# Patient Record
Sex: FEMALE | Race: ASIAN | NOT HISPANIC OR LATINO | Employment: UNEMPLOYED | URBAN - METROPOLITAN AREA
[De-identification: names, ages, dates, MRNs, and addresses within clinical notes are randomized per-mention and may not be internally consistent; named-entity substitution may affect disease eponyms.]

---

## 2017-01-06 ENCOUNTER — TRANSFERRED RECORDS (OUTPATIENT)
Dept: HEALTH INFORMATION MANAGEMENT | Facility: CLINIC | Age: 42
End: 2017-01-06

## 2017-03-06 ENCOUNTER — OFFICE VISIT (OUTPATIENT)
Dept: ENDOCRINOLOGY | Facility: CLINIC | Age: 42
End: 2017-03-06

## 2017-03-06 VITALS
HEIGHT: 64 IN | WEIGHT: 244.4 LBS | DIASTOLIC BLOOD PRESSURE: 80 MMHG | BODY MASS INDEX: 41.73 KG/M2 | HEART RATE: 90 BPM | SYSTOLIC BLOOD PRESSURE: 126 MMHG

## 2017-03-06 DIAGNOSIS — C73 THYROID CANCER (H): ICD-10-CM

## 2017-03-06 DIAGNOSIS — E89.0 POSTSURGICAL HYPOTHYROIDISM: ICD-10-CM

## 2017-03-06 DIAGNOSIS — E89.0 POSTSURGICAL HYPOTHYROIDISM: Primary | ICD-10-CM

## 2017-03-06 DIAGNOSIS — R49.0 HOARSENESS: ICD-10-CM

## 2017-03-06 LAB
T4 FREE SERPL-MCNC: 1.33 NG/DL (ref 0.76–1.46)
TSH SERPL DL<=0.05 MIU/L-ACNC: 0.76 MU/L (ref 0.4–4)

## 2017-03-06 RX ORDER — DIPHENOXYLATE HYDROCHLORIDE AND ATROPINE SULFATE 2.5; .025 MG/1; MG/1
1 TABLET ORAL
COMMUNITY

## 2017-03-06 ASSESSMENT — ENCOUNTER SYMPTOMS
STIFFNESS: 1
MYALGIAS: 1
POLYPHAGIA: 0
SINUS CONGESTION: 1
TROUBLE SWALLOWING: 1
RECTAL BLEEDING: 1
DECREASED APPETITE: 0
WEIGHT LOSS: 0
SPUTUM PRODUCTION: 1
RESPIRATORY PAIN: 0
BACK PAIN: 1
COUGH DISTURBING SLEEP: 0
CHILLS: 0
HALLUCINATIONS: 0
HYPOTENSION: 0
JAUNDICE: 0
CONSTIPATION: 1
SYNCOPE: 0
ALTERED TEMPERATURE REGULATION: 1
LEG PAIN: 1
FATIGUE: 1
FEVER: 0
FLANK PAIN: 0
DECREASED LIBIDO: 0
NECK MASS: 0
NECK PAIN: 1
DYSPNEA ON EXERTION: 0
NERVOUS/ANXIOUS: 1
DIARRHEA: 1
LEG SWELLING: 1
COUGH: 1
MUSCLE CRAMPS: 1
NIGHT SWEATS: 1
DEPRESSION: 1
ABDOMINAL PAIN: 1
BLOOD IN STOOL: 0
WEIGHT GAIN: 1
TASTE DISTURBANCE: 1
SORE THROAT: 1
WHEEZING: 1
SHORTNESS OF BREATH: 1
TACHYCARDIA: 1
HEARTBURN: 1
BOWEL INCONTINENCE: 0
SMELL DISTURBANCE: 0
HEMOPTYSIS: 0
CLAUDICATION: 1
MUSCLE WEAKNESS: 1
DYSURIA: 0
INSOMNIA: 1
JOINT SWELLING: 1
DIFFICULTY URINATING: 0
HEMATURIA: 0
EXERCISE INTOLERANCE: 1
HOT FLASHES: 1
SNORES LOUDLY: 1
HOARSE VOICE: 1
DECREASED CONCENTRATION: 1
SINUS PAIN: 1
RECTAL PAIN: 0
ARTHRALGIAS: 1
VOMITING: 1
HYPERTENSION: 1
SLEEP DISTURBANCES DUE TO BREATHING: 0
PALPITATIONS: 1
INCREASED ENERGY: 1
ORTHOPNEA: 1
PANIC: 1
LIGHT-HEADEDNESS: 1
POLYDIPSIA: 0
NAUSEA: 1
BLOATING: 1
POSTURAL DYSPNEA: 1

## 2017-03-06 NOTE — LETTER
3/6/2017       RE: Sinai Shelley  [address]  [facility] [address]     Dear Colleague,    Thank you for referring your patient, Sinai Shelley, to the [facility] ENDOCRINOLOGY at [facility]. Please see a copy of my visit note below.    Endocrinology Consult Note    Attending ASSESSMENT/PLAN:     1.  Papillary thyroid carcinoma, 1.8 cm, + ETE, LN negative, pT3, pN0, pMx; She has been treated with total Tx. TNM stage I or II; MACIS < 6 assuming no distant mets  Labs today;   Discussed radioiodine issue again.  Suggested 123I TBS as next step.  I have counseled her on protocol, gave her example schedule with assignment to let me know when she could do this.    COURTNEY [place] images to follow up on the 10/16 history she described.      2.  Post surgical hypothyroidism. Treat to target TSH < 0.4.Unstable due to recent LT4 dose reduction.  Labs today.    3.  Lung nodules thought to be granulomatous disease by CXR and CT.     4.  Hoarseness and throat symptoms - Recommend OTC GERD treatment for 2 weeks.  IF not resolved she will need ENT evaluation.      .   Karime Horn MD      Cc/HISTORY OF PRESENT ILLNESS 41 year old woman returns for follow up of papillary thyroid cancer and post surgical hypothyoridism. She was last seen by me 8/16 .  At that time she was on  175 x 6.5 /week, divided. Today she reports the lT4 dose is 150 mcg/day.  She has been on this dose since 9/14/16.      On 5/5/15 she underwent total Tx removing  papillary thyroid carcinoma 1.8 cm with + ETE; LN negative 0/2  pT3pN0,pMx    MACIS  Total 4.64 assuming complete and no distant    We have the following tumor marker data:  6/16/14: Tg < 0.1, JOE < 0.4, TSH 0.03  8/18/15: Tg < 0.1, JOE < 0.4, TSH 0.47  8/16/16: Tg < 0.1, JOE < 0.4, TSH 0.01    images on PACS  9/9/13 chest CT: thyroid asymmetrical, right>  L; dense calcs isthmus region  8/18/16 neck US (reviewed by me again today: no  abnormal lymph nodes .     No history of childhood radiation exposure    10/16 she went to ED at [facility].  She had CT scan of the chest.  It showed an abnormality in the neck. She thinks she had an US after that.  She never heard about the results.     REVIEW OF SYSTEMS   weight gain  Energy level is poor  Really tired  Sleep is not good - wakes up a lot.  Voice is a lot more strained - feel like heightened gag reflex as well.    Flu about one month ago  Cardiac: a little tachycardiac felt form time to time -- she last had it last week  Respiratory: RIOS with exertion - ? Vs MS knee problem - see below   GI:  A lot of irritable bowel syndrome - more loose stools  Back pain - low back and into hips  A lot of hot flashes in the night  No longer lactating  Menses sporadic - has Mirena IUD;   A lot of problems with knees and hips - almost feels like weakness-- harder to go down stairs than up; feels in hips and groin      Past Medical History  Past Medical History   Diagnosis Date     Contact dermatitis and other eczema, due to unspecified cause      Ganglion, unspecified      Migraine, unspecified, without mention of intractable migraine without mention of status migrainosus      Nausea alone      Other specified disorders of biliary tract      Pain in joint, site unspecified      PONV (postoperative nausea and vomiting)      Streptococcal sore throat      Unspecified gastritis and gastroduodenitis without mention of hemorrhage      Unspecified vitamin D deficiency      Past Surgical History   Procedure Laterality Date     Appendectomy       Orthopedic surgery       knee arthroscopy, arthrocentesis aspiration of ganglion cyst of left wrist     Laparoscopic cholecystectomy  9/6/2013     Procedure: LAPAROSCOPIC CHOLECYSTECTOMY;  LAPAROSCOPIC CHOLECYSTECTOMY;  Surgeon: Chaparro Benton MD;  [facility]: [facility]     Thyroidectomy N/A 5/5/2015     Procedure: THYROIDECTOMY;  Surgeon: Rosenda Coughlin MD;   "Location: [facility] OR       Medications    Current Outpatient Prescriptions   Medication Sig Dispense Refill     levothyroxine (SYNTHROID, LEVOTHROID) 150 MCG tablet Take 1 tablet (150 mcg) by mouth daily 90 tablet 3     cholecalciferol (VITAMIN D3 MAXIMUM STRENGTH) 5000 UNITS CAPS        Multiple Vitamin (MULTI-VITAMINS) TABS Take 1 tablet by mouth         Allergies  No Known Allergies    Family History  family history is negative for Unknown/Adopted.    Social History  Social History   Substance Use Topics     Smoking status: Never Smoker     Smokeless tobacco: Never Used     Alcohol use No     ; 4 kids (age 2, 5, 11 and 15)      Physical Exam  /80 (BP Location: Right arm, Patient Position: Chair, Cuff Size: Adult Regular)  Pulse 90  Ht 1.626 m (5' 4\")  Wt 110.9 kg (244 lb 6.4 oz)  BMI 41.95 kg/m2  Body mass index is 41.95 kg/(m^2).  GENERAL : pleasant young woman  In no apparent distress.   She sounds hoarse  SKIN: Normal color,temperature, texture.  No hirsutism, alopecia  .  No purple straie  EYES: PER,  No scleral icterus,  No proptosis, conjunctival redness, stare, retraction  NECK: No visible masses. No palpable adenopathy, or masses.   RESP: Lungs clear to auscultation bilaterally  CARDIAC: Regular rate and rhythm, normal S1 S2, without murmurs, rubs or gallops     NEURO: awake, alert, responds appropriately to questions.Moves all extremities; Gait normal.  No  tremor of the outstretched hand.  EXTREMITIES: No clubbing, cyanosis or edema.    DATA REVIEW    ENDO THYROID LABS-[facility] Latest Ref Rng & Units 8/16/2016 11/6/2015   TSH 0.40 - 4.00 mU/L 0.01 (L) 0.07 (L)   T4 FREE 0.76 - 1.46 ng/dL 1.43 1.30       Again, thank you for allowing me to participate in the care of your patient.      Sincerely,    Karime Horn MD      "

## 2017-03-06 NOTE — PROGRESS NOTES
Endocrinology Consult Note    Attending ASSESSMENT/PLAN:     1.  Papillary thyroid carcinoma, 1.8 cm, + ETE, LN negative, pT3, pN0, pMx; She has been treated with total Tx. TNM stage I or II; MACIS < 6 assuming no distant mets  Labs today;   Discussed radioiodine issue again.  Suggested 123I TBS as next step.  I have counseled her on protocol, gave her example schedule with assignment to let me know when she could do this.    COURTNEY [place] images to follow up on the 10/16 history she described.      Addendum : outside images I requested are not yet on PACS as of 3/16/17.     2.  Post surgical hypothyroidism. Treat to target TSH < 0.4.Unstable due to recent LT4 dose reduction.  Labs today.    3.  Lung nodules thought to be granulomatous disease by CXR and CT.     4.  Hoarseness and throat symptoms - Recommend OTC GERD treatment for 2 weeks.  IF not resolved she will need ENT evaluation.      .   Karime oHrn MD      Cc/HISTORY OF PRESENT ILLNESS 41 year old woman returns for follow up of papillary thyroid cancer and post surgical hypothyoridism. She was last seen by me 8/16 .  At that time she was on  175 x 6.5 /week, divided. Today she reports the lT4 dose is 150 mcg/day, a dose reduction made by me.  She has been on this dose since 9/14/16.      On 5/5/15 she underwent total Tx removing  papillary thyroid carcinoma 1.8 cm with + ETE; LN negative 0/2  pT3pN0,pMx    MACIS  Total 4.64 assuming complete and no distant    We have the following tumor marker data:  6/16/14: Tg < 0.1, JOE < 0.4, TSH 0.03  8/18/15: Tg < 0.1, JOE < 0.4, TSH 0.47  8/16/16: Tg < 0.1, JOE < 0.4, TSH 0.01  3/6/17: Tg 0.14, JOE < 0.4, TSH 0.76, free T4 1.33 on LT4 150 mcg/day - this followed the appt - was not discussed at the appt.     images on PACS  9/9/13 chest CT: thyroid asymmetrical, right>  L; dense calcs isthmus region  8/18/16 neck US (reviewed by me again today: no abnormal lymph nodes .     No history of childhood radiation  exposure    10/16 she went to ED at [facility].  She had CT scan of the chest.  It showed an abnormality in the neck. She thinks she had an US after that.  She never heard about the results.     REVIEW OF SYSTEMS   weight gain  Energy level is poor  Really tired  Sleep is not good - wakes up a lot.  Voice is a lot more strained - feel like heightened gag reflex as well.    Flu about one month ago  Cardiac: a little tachycardiac felt form time to time -- she last had it last week  Respiratory: RIOS with exertion - ? Vs MS knee problem - see below   GI:  A lot of irritable bowel syndrome - more loose stools  Back pain - low back and into hips  A lot of hot flashes in the night  No longer lactating  Menses sporadic - has Mirena IUD;   A lot of problems with knees and hips - almost feels like weakness-- harder to go down stairs than up; feels in hips and groin    Past Medical History  Past Medical History   Diagnosis Date     Contact dermatitis and other eczema, due to unspecified cause      Ganglion, unspecified      Migraine, unspecified, without mention of intractable migraine without mention of status migrainosus      Nausea alone      Other specified disorders of biliary tract      Pain in joint, site unspecified      PONV (postoperative nausea and vomiting)      Streptococcal sore throat      Unspecified gastritis and gastroduodenitis without mention of hemorrhage      Unspecified vitamin D deficiency      Past Surgical History   Procedure Laterality Date     Appendectomy       Orthopedic surgery       knee arthroscopy, arthrocentesis aspiration of ganglion cyst of left wrist     Laparoscopic cholecystectomy  9/6/2013     Procedure: LAPAROSCOPIC CHOLECYSTECTOMY;  LAPAROSCOPIC CHOLECYSTECTOMY;  Surgeon: Chaparro Benton MD;  Location: [facility]     Thyroidectomy N/A 5/5/2015     Procedure: THYROIDECTOMY;  Surgeon: Rosenda Coughlin MD;  Location: [facility] OR       Medications    Current Outpatient  "Prescriptions   Medication Sig Dispense Refill     levothyroxine (SYNTHROID, LEVOTHROID) 150 MCG tablet Take 1 tablet (150 mcg) by mouth daily 90 tablet 3     cholecalciferol (VITAMIN D3 MAXIMUM STRENGTH) 5000 UNITS CAPS        Multiple Vitamin (MULTI-VITAMINS) TABS Take 1 tablet by mouth         Allergies  No Known Allergies    Family History  family history is negative for Unknown/Adopted.    Social History  Social History   Substance Use Topics     Smoking status: Never Smoker     Smokeless tobacco: Never Used     Alcohol use No     ; 4 kids (age 2, 5, 11 and 15)      Physical Exam  /80 (BP Location: Right arm, Patient Position: Chair, Cuff Size: Adult Regular)  Pulse 90  Ht 1.626 m (5' 4\")  Wt 110.9 kg (244 lb 6.4 oz)  BMI 41.95 kg/m2  Body mass index is 41.95 kg/(m^2).  GENERAL : pleasant young woman  In no apparent distress.   She sounds hoarse  SKIN: Normal color,temperature, texture.  No hirsutism, alopecia  .  No purple straie  EYES: PER,  No scleral icterus,  No proptosis, conjunctival redness, stare, retraction  NECK: No visible masses. No palpable adenopathy, or masses.   RESP: Lungs clear to auscultation bilaterally  CARDIAC: Regular rate and rhythm, normal S1 S2, without murmurs, rubs or gallops     NEURO: awake, alert, responds appropriately to questions.Moves all extremities; Gait normal.  No  tremor of the outstretched hand.  EXTREMITIES: No clubbing, cyanosis or edema.    DATA REVIEW    Results for BLAISE BELTRAN (MRN [id number]) as of 3/16/2017 20:28   Ref. Range 3/6/2017 14:01   T4 Free Latest Ref Range: 0.76 - 1.46 ng/dL 1.33   TSH Latest Ref Range: 0.40 - 4.00 mU/L 0.76   Lab Scanned Result Unknown THYROG & ANTIBODI...     ENDO THYROID LABS-[facility] Latest Ref Rng & Units 8/16/2016 11/6/2015   TSH 0.40 - 4.00 mU/L 0.01 (L) 0.07 (L)   T4 FREE 0.76 - 1.46 ng/dL 1.43 1.30     "

## 2017-03-06 NOTE — NURSING NOTE
"Chief Complaint   Patient presents with     RECHECK     F/U FOR POSTSURGICAL HYPOTHYROIDISM AND PAPILLARY THYROID CARCINOMA        Initial /80 (BP Location: Right arm, Patient Position: Chair, Cuff Size: Adult Regular)  Pulse 90  Ht 1.626 m (5' 4\")  Wt 110.9 kg (244 lb 6.4 oz)  BMI 41.95 kg/m2 Estimated body mass index is 41.95 kg/(m^2) as calculated from the following:    Height as of this encounter: 1.626 m (5' 4\").    Weight as of this encounter: 110.9 kg (244 lb 6.4 oz).  Medication Reconciliation: complete       Analia Ferrell CMA     "

## 2017-03-06 NOTE — MR AVS SNAPSHOT
After Visit Summary   3/6/2017    Sinai Shelley    MRN: [id number]           Patient Information     Date Of Birth          1975        Visit Information        Provider Department      3/6/2017 1:00 PM Karime Horn MD [facility] Endocrinology        Today's Diagnoses     Postsurgical hypothyroidism    -  1    Papillary thyroid carcinoma          Care Instructions    Try taking an anti acid reflux treatment (such as nexium or prilosec) as directed on the over the counter directions.  Take it faithfully for 2 weeks and see if this clears up the voice.    If it does not you should see ENT    EXAMPLE PROTOCOL FOR THE DIAGNOSTIC WHOLE BODY RADIOACTIVE IODINE SCAN;   Wednesday 2 weeks before the test - Start low iodine diet < 50 mcg/day.   On the week of the test  Monday Thyrogen - this is one very expensive shot.  Given in the clinic [facility]  Tuesday Thyrogen  This is one very expensive shot.  Given in the clinic [facility]  Wednesday Report to Nuclear Medicine ([facility] 2nd fl)  for Radioiodine dose  This is a diagnostic dose of 123I.  This does not require radiation isolation afterwards.   Thursday Report to Nuclear medicine ([facility] 2nd floor)  for total body scan   Friday Lab ([facility] lst floor)  Work can be any time.  Stop low iodine diet.         Follow-ups after your visit        Follow-up notes from your care team     Return in about 6 months (around 9/6/2017).      Your next 10 appointments already scheduled     Mar 06, 2017  2:15 PM CST   LAB with  LAB    Health Lab ([facility])    [address] [place] [address]  1st Floor  [facility] [address] [phone]           Patient must bring picture ID.  Patient should be prepared to give a urine specimen  Please do not eat 10-12 hours before your appointment if you are coming in fasting for labs on lipids, cholesterol, or glucose (sugar).  Pregnant women should follow their Care Team instructions. Water with medications  is okay. Do not drink coffee or other fluids.   If you have concerns about taking  your medications, please ask at office or if scheduling via [employer], send a message by clicking on Secure Messaging, Message Your Care Team.            Sep 12, 2017  1:30 PM CDT   (Arrive by 1:15 PM)   RETURN ENDOCRINE with Karime Horn MD   [facility] Endocrinology ([facility])    [address]  [address] [facility] [address] [phone]              Future tests that were ordered for you today     Open Future Orders        Priority Expected Expires Ordered    TSH Routine  3/6/2018 3/6/2017    T4 free Routine  3/6/2018 3/6/2017    Thyroglobulin (Total, antibody & recovery %) Routine  3/6/2018 3/6/2017            Who to contact     Please call your clinic at [phone] to:    Ask questions about your health    Make or cancel appointments    Discuss your medicines    Learn about your test results    Speak to your doctor   If you have compliments or concerns about an experience at your clinic, or if you wish to file a complaint, please contact [facility] Patient Relations at [phone] or email us at [email or web address]         Additional Information About Your Visit        [employer] Information     [employer] gives you secure access to your electronic health record. If you see a primary care provider, you can also send messages to your care team and make appointments. If you have questions, please call your primary care clinic.  If you do not have a primary care provider, please call [phone] and they will assist you.      [employer] is an electronic gateway that provides easy, online access to your medical records. With [employer], you can request a clinic appointment, read your test results, renew a prescription or communicate with your care team.     To access your existing account, please contact your [facility] or call  "[phone] for assistance.        Care EveryWhere ID     This is your Care EveryWhere ID. This could be used by other organizations to access your [facility] medical records  [id number]        Your Vitals Were     Pulse Height BMI (Body Mass Index)             90 1.626 m (5' 4\") 41.95 kg/m2          Blood Pressure from Last 3 Encounters:   03/06/17 126/80   08/16/16 139/78   11/06/15 147/78    Weight from Last 3 Encounters:   03/06/17 110.9 kg (244 lb 6.4 oz)   08/16/16 107.7 kg (237 lb 6.4 oz)   11/06/15 104.8 kg (231 lb)               Primary Care Provider Office Phone # Fax #    Jim Benoit [phone]       [facility] [id number] [facility] [address]        Thank you!     Thank you for choosing [facility] ENDOCRINOLOGY  for your care. Our goal is always to provide you with excellent care. Hearing back from our patients is one way we can continue to improve our services. Please take a few minutes to complete the written survey that you may receive in the mail after your visit with us. Thank you!             Your Updated Medication List - Protect others around you: Learn how to safely use, store and throw away your medicines at www.disposemymeds.org.          This list is accurate as of: 3/6/17  1:47 PM.  Always use your most recent med list.                   Brand Name Dispense Instructions for use    levothyroxine 150 MCG tablet    SYNTHROID/LEVOTHROID    90 tablet    Take 1 tablet (150 mcg) by mouth daily       MULTI-VITAMINS Tabs      Take 1 tablet by mouth       VITAMIN D3 MAXIMUM STRENGTH 5000 UNITS Caps   Generic drug:  cholecalciferol            "

## 2017-03-06 NOTE — PATIENT INSTRUCTIONS
Try taking an anti acid reflux treatment (such as nexium or prilosec) as directed on the over the counter directions.  Take it faithfully for 2 weeks and see if this clears up the voice.    If it does not you should see ENT    EXAMPLE PROTOCOL FOR THE DIAGNOSTIC WHOLE BODY RADIOACTIVE IODINE SCAN;   Wednesday 2 weeks before the test - Start low iodine diet < 50 mcg/day.   On the week of the test  Monday Thyrogen - this is one very expensive shot.  Given in the clinic [facility]  Tuesday Thyrogen  This is one very expensive shot.  Given in the clinic [facility]  Wednesday Report to Nuclear Medicine ([facility] 2nd fl)  for Radioiodine dose  This is a diagnostic dose of 123I.  This does not require radiation isolation afterwards.   Thursday Report to Nuclear medicine ([facility] 2nd floor)  for total body scan   Friday Lab ([facility] lst floor)  Work can be any time.  Stop low iodine diet.

## 2017-03-12 LAB — LAB SCANNED RESULT: NORMAL

## 2017-03-16 DIAGNOSIS — C73 THYROID CANCER (H): ICD-10-CM

## 2017-03-16 DIAGNOSIS — R91.8 MULTIPLE LUNG NODULES ON CT: ICD-10-CM

## 2017-03-16 DIAGNOSIS — E89.0 POSTSURGICAL HYPOTHYROIDISM: ICD-10-CM

## 2017-03-16 RX ORDER — LEVOTHYROXINE SODIUM 150 UG/1
TABLET ORAL
Qty: 96 TABLET | Refills: 3 | Status: SHIPPED
Start: 2017-03-16 | End: 2018-03-13

## 2017-03-21 NOTE — PROGRESS NOTES
Receipt of CD with outside images from [facility].  I am sending it all to be uploaded to PACS    Karime Horn MD

## 2017-09-12 ENCOUNTER — OFFICE VISIT (OUTPATIENT)
Dept: ENDOCRINOLOGY | Facility: CLINIC | Age: 42
End: 2017-09-12

## 2017-09-12 VITALS
HEIGHT: 64 IN | SYSTOLIC BLOOD PRESSURE: 131 MMHG | DIASTOLIC BLOOD PRESSURE: 83 MMHG | HEART RATE: 121 BPM | WEIGHT: 225 LBS | BODY MASS INDEX: 38.41 KG/M2

## 2017-09-12 DIAGNOSIS — E89.0 POSTSURGICAL HYPOTHYROIDISM: Primary | ICD-10-CM

## 2017-09-12 DIAGNOSIS — C73 THYROID CANCER (H): ICD-10-CM

## 2017-09-12 DIAGNOSIS — R91.8 MULTIPLE LUNG NODULES ON CT: ICD-10-CM

## 2017-09-12 RX ORDER — METHYLPHENIDATE HYDROCHLORIDE 36 MG/1
TABLET ORAL
COMMUNITY
End: 2018-03-05

## 2017-09-12 ASSESSMENT — PAIN SCALES - GENERAL: PAINLEVEL: NO PAIN (0)

## 2017-09-12 NOTE — LETTER
9/12/2017       RE: Sinai Shelley  [address]  [facility] [address]     Dear Colleague,    Thank you for referring your patient, Sinai Shelley, to the [facility] ENDOCRINOLOGY at [facility]. Please see a copy of my visit note below.    Endocrinology Consult Note    Attending ASSESSMENT/PLAN:     1.  Papillary thyroid carcinoma, 1.8 cm, + ETE, LN negative, pT3, pN0, pMx; She has been treated with total Tx. TNM stage I or II; MACIS < 6 assuming no distant mets.  She has not had RRA.  Labs today;    In the past I had Suggested 123I TBS as next step but she hasn't had it.    Addendum : outside images I requested are not yet on PACS as of today  Neck US  Chest CT    2.  Post surgical hypothyroidism. Treat to target TSH < 0.4.Unstable due to  LT4 dose increase since last appt.  Current status unknown  Labs today.    3.  Lung nodules thought to be granulomatous disease by CXR and CT.   The CD with what I thought was 10/16 chest CT is still not uploaded to PACS (I believe I sent CD to radiology for this 3/17, though perhaps what  sent didn't incde the 10/16 CT, I hadn't itemized what was on the CD before sending it)  Chest CT here to resolve this.      Karime Horn MD      Cc/HISTORY OF PRESENT ILLNESS 42 year old woman returns for follow up of papillary thyroid cancer and post surgical hypothyoridism. She was last seen by me 3/6/17.  At that time she was on  lT4 dose 150 mcg/day since 9/14/16.  I increased the dose to 150 * 7.5/week, divided. She was to have follow up labs 2 months later, but didn't.    On 5/5/15 she underwent total Tx removing  papillary thyroid carcinoma 1.8 cm with + ETE; LN negative 0/2  pT3pN0,pMx  MACIS  Total 4.64 assuming complete and no distant    We have the following tumor marker data:  6/16/14: Tg < 0.1, JOE < 0.4, TSH 0.03  8/18/15: Tg < 0.1, JOE < 0.4, TSH 0.47  8/16/16: Tg < 0.1, JOE < 0.4, TSH 0.01  3/6/17: Tg 0.14, JOE <  0.4, TSH 0.76, free T4 1.33 on LT4 150 mcg/day -     images on PACS  9/9/13 chest CT: thyroid asymmetrical, right>  L; dense calcs isthmus region  8/18/16 neck US: no abnormal lymph nodes .   9/15/17 neck US (following appt): negative     10/16 she went to ED at [facility].  She had CT scan of the chest.  It showed an abnormality in the neck. She thinks she had an US after that.     REVIEW OF SYSTEMS   Weight loss is due to significant effort - healthy diet, exercise and PT for the hips.  She is eating salad bid x 3 months.    Throat is doing fine, voice is unchanged  Cardiac: negative  Respiratory: negative  GI:  A lot of GI issues lately -upset stomach and diarrhea  She has been on concerta only a few weeks - helping a little bit, for treatment of ADHD-   Still has Mirena - gets periods on this every month  Rushed to get here today    Past Medical History  Past Medical History:   Diagnosis Date     Contact dermatitis and other eczema, due to unspecified cause      Ganglion, unspecified      Migraine, unspecified, without mention of intractable migraine without mention of status migrainosus      Nausea alone      Other specified disorders of biliary tract      Pain in joint, site unspecified      PONV (postoperative nausea and vomiting)      Streptococcal sore throat      Unspecified gastritis and gastroduodenitis without mention of hemorrhage      Unspecified vitamin D deficiency      Past Surgical History:   Procedure Laterality Date     APPENDECTOMY       LAPAROSCOPIC CHOLECYSTECTOMY  9/6/2013    Procedure: LAPAROSCOPIC CHOLECYSTECTOMY;  LAPAROSCOPIC CHOLECYSTECTOMY;  Surgeon: Chaparro Benton MD;  Location: [facility]     ORTHOPEDIC SURGERY      knee arthroscopy, arthrocentesis aspiration of ganglion cyst of left wrist     THYROIDECTOMY N/A 5/5/2015    Procedure: THYROIDECTOMY;  Surgeon: Rosenda Coughlin MD;  Location: [facility] OR       Medications    Current Outpatient Prescriptions   Medication Sig  "Dispense Refill     methylphenidate ER (CONCERTA) 36 MG CR tablet        levothyroxine (SYNTHROID/LEVOTHROID) 150 MCG tablet Monday-Saturday: (1 tablet/day);   Sunday: (1.5 tablet) 96 tablet 3     Multiple Vitamin (MULTI-VITAMINS) TABS Take 1 tablet by mouth         Allergies  No Known Allergies    Family History  family history is negative for Unknown/Adopted.    Social History  Social History   Substance Use Topics     Smoking status: Never Smoker     Smokeless tobacco: Never Used     Alcohol use No     ; 4 kids; exercise is walking with [employer] group x 6 months--3 miles 3+ times/week; she is doing light weight training; PT is 3 times/week -- bike, exercises.         Physical Exam  /83  Pulse 121  Ht 1.626 m (5' 4\")  Wt 102.1 kg (225 lb)  BMI 38.62 kg/m2  Body mass index is 38.62 kg/(m^2).  GENERAL : pleasant young woman  In no apparent distress.     SKIN: Normal color,temperature, texture.  No grosshirsutism, alopecia  .    EYES: PER,  No scleral icterus,  No proptosis, conjunctival redness, stare, retraction  NECK: No visible masses. No palpable adenopathy, or masses.   RESP: Lungs clear to auscultation bilaterally  CARDIAC: hyperdynamic Regular rate and rhythm, normal S1 S2, without murmurs, rubs or gallops     NEURO: awake, alert, responds appropriately to questions.Moves all extremities; Gait normal.  +  tremor of the outstretched hand.  EXTREMITIES: No clubbing, cyanosis or edema.    DATA REVIEW    Results for JONO ROBERTSSONBLAISE (MRN [id number]) as of 9/15/2017 17:30   Ref. Range 9/15/2017 13:19 9/15/2017 13:40   T4 Free Latest Ref Range: 0.76 - 1.46 ng/dL 1.15    TSH Latest Ref Range: 0.40 - 4.00 mU/L <0.01 (L)    CT CHEST W/O CONTRAST Unknown  Rpt     EXAMINATION: CT CHEST W/O CONTRAST, 9/15/2017 1:40 PM     CLINICAL HISTORY: Postprocedural hypothyroidism, Malignant neoplasm of  thyroid gland, Other nonspecific abnormal finding of lung field     COMPARISON: CT " 9/9/2013.     TECHNIQUE: CT imaging obtained through the chest without contrast.  Coronal and axial MIP reformatted images obtained.      CONTRAST:  none.     FINDINGS:     Lines and tubes: None.     Mediastinum: Postsurgical changes of the thyroidectomy. Central  tracheobronchial tree is patent. Heart size is normal. No pericardial  effusion.  Normal thoracic vasculature. No thoracic lymphadenopathy.  Calcified mediastinal and hilar lymph nodes.     Lungs: Stable 4 mm subsegmental nodule, immediately adjacent to the  right major fissure in the right lower lobe (series 4, image 188).  Unchanged 2 mm pulmonary nodule in the posterior right upper lobe  (series 4, image 93). Unchanged 2 mm pulmonary nodule in the left  upper lobe (series 4, image 121). No new pulmonary nodules. No  suspicious pleural thickening. Scattered calcified granulomas. No  consolidation. No pleural effusion or pneumothorax.     Bones and soft tissues: No suspicious bone findings.      Upper abdomen: Limited. Cholecystectomy clips.         IMPRESSION:   1. Post surgical changes of the thyroidectomy. No evidence of  metastatic disease in the chest.  2. Evidence of a prior granulomatous infection.      I have personally reviewed the examination and initial interpretation  and I agree with the findings.     JESSICA NIELSON MD    EXAMINATION: US HEAD NECK SOFT TISSUE, 9/15/2017 2:06 PM      COMPARISON: Ultrasound neck dated 8/18/2016     HISTORY: Postsurgical hypothyroidism, papillary thyroid carcinoma     FINDINGS:      Lymph nodes are measured bilaterally with measurements given in  craniocaudal, transverse and AP dimensions as follows:     Right:  Level 1: None  Level 2: None  Level 3: None  Level 4: None  Level 5: None  Level 6: None     Left:  Level 1: None  Level 2: Single benign-appearing lymph node with fatty hilum is seen  measuring 1 x 0.6 x 0.5 cm; previously measured 1.1 x 0.6 x 0.6 cm  Level 3: None  Level 4: None  Level 5:  None  Level 6: None         IMPRESSION: Single benign-appearing lymph node at level 2 on the left,  significantly changed compared with previous ultrasound dated  8/18/2016.     I have personally reviewed the examination and initial interpretation  and I agree with the findings.     MORRIS YEBOAH MD

## 2017-09-12 NOTE — MR AVS SNAPSHOT
After Visit Summary   9/12/2017    Sinai Shelley    MRN: [id number]           Patient Information     Date Of Birth          1975        Visit Information        Provider Department      9/12/2017 1:30 PM Karime Horn MD [facility] Endocrinology        Today's Diagnoses     Postsurgical hypothyroidism    -  1    Papillary thyroid carcinoma        Multiple lung nodules on CT           Follow-ups after your visit        Follow-up notes from your care team     Return in about 6 months (around 3/12/2018).      Your next 10 appointments already scheduled     Sep 15, 2017 12:40 PM CDT   (Arrive by 12:25 PM)   CT CHEST W/O CONTRAST with UCCT2   [facility] CT ([facility])    [address] [place] [id number] [phone]           Please bring any scans or X-rays taken at other hospitals, if similar tests were done. Also bring a list of your medicines, including vitamins, minerals and over-the-counter drugs. It is safest to leave personal items at home.  Be sure to tell your doctor:   If you have any allergies.   If there s any chance you are pregnant.   If you are breastfeeding.   If you have any special needs.  You do not need to do anything special to prepare.  Please wear loose clothing, such as a sweat suit or jogging clothes. Avoid snaps, zippers and other metal. We may ask you to undress and put on a hospital gown.            Sep 15, 2017  1:00 PM CDT   US HEAD NECK SOFT TISSUE with UCUS3   [facility] US ([facility])    [address] [place] [address] [phone]           Please bring a list of your medicines (including vitamins, minerals and over-the-counter drugs). Also, tell your doctor about any allergies you may have. Wear comfortable clothes and leave your valuables at home.  You do not need to do anything special to prepare for your exam.   Please call the Imaging Department at your exam site with any questions.            Mar 05, 2018  1:00 PM CST   (Arrive by 12:45 PM)   RETURN ENDOCRINE with Karime Horn MD   [facility] Endocrinology ([facility])    [address] [id number] [phone]              Future tests that were ordered for you today     Open Future Orders        Priority Expected Expires Ordered    CT Chest w/o contrast Routine  4/10/2018 9/12/2017    US head neck soft tissue Routine  4/10/2018 9/12/2017    Thyroglobulin (Total, antibody & recovery %) Routine  9/12/2018 9/12/2017            Who to contact     Please call your clinic at [phone] to:    Ask questions about your health    Make or cancel appointments    Discuss your medicines    Learn about your test results    Speak to your doctor   If you have compliments or concerns about an experience at your clinic, or if you wish to file a complaint, please contact [facility] Patient Relations at [phone] or email us at [email or web address]         Additional Information About Your Visit        [email or web address] Information     Buzztalat gives you secure access to your electronic health record. If you see a primary care provider, you can also send messages to your care team and make appointments. If you have questions, please call your primary care clinic.  If you do not have a primary care provider, please call [phone] and they will assist you.      Reocar is an electronic gateway that provides easy, online access to your medical records. With Reocar, you can request a clinic appointment, read your test results, renew a prescription or communicate with your care team.     To access your existing account, please contact your [facility] or call [phone] for assistance.        Care EveryWhere ID     This is your Care EveryWhere ID. This could be used  "by other organizations to access your [facility] medical records  [id number]        Your Vitals Were     Pulse Height BMI (Body Mass Index)             121 1.626 m (5' 4\") 38.62 kg/m2          Blood Pressure from Last 3 Encounters:   09/12/17 131/83   03/06/17 126/80   08/16/16 139/78    Weight from Last 3 Encounters:   09/12/17 102.1 kg (225 lb)   03/06/17 110.9 kg (244 lb 6.4 oz)   08/16/16 107.7 kg (237 lb 6.4 oz)                 Today's Medication Changes          These changes are accurate as of: 9/12/17  2:08 PM.  If you have any questions, ask your nurse or doctor.               Stop taking these medicines if you haven't already. Please contact your care team if you have questions.     VITAMIN D3 MAXIMUM STRENGTH 5000 UNITS Caps   Generic drug:  cholecalciferol   Stopped by:  Karime Horn MD                    Primary Care Provider Office Phone # Fax #    Jim Benoit [phone]       [facility] [address] [facility] [address]        Equal Access to Services     [facility]: Hadii julee franklino Sopraneeth, waaxda luqadaha, qaybta kaalmada lainey, kiley newman . So [facility] [phone].    ATENCIÓN: Si habla español, tiene a rodriguez disposición servicios gratuitos de asistencia lingüística. [facility] [phone].    We comply with applicable federal civil rights laws and [place] laws. We do not discriminate on the basis of race, color, national origin, age, disability sex, sexual orientation or gender identity.            Thank you!     Thank you for choosing [facility] ENDOCRINOLOGY  for your care. Our goal is always to provide you with excellent care. Hearing back from our patients is one way we can continue to improve our services. Please take a few minutes to complete the written survey that you may receive in the mail after your visit with us. Thank you!             Your Updated Medication List - Protect others around you: Learn how to safely use, " store and throw away your medicines at www.disposemymeds.org.          This list is accurate as of: 9/12/17  2:08 PM.  Always use your most recent med list.                   Brand Name Dispense Instructions for use Diagnosis    CONCERTA 36 MG CR tablet   Generic drug:  methylphenidate ER           levothyroxine 150 MCG tablet    SYNTHROID/LEVOTHROID    96 tablet    Monday-Saturday: (1 tablet/day);  Sunday: (1.5 tablet)    Postsurgical hypothyroidism, Thyroid cancer (H)       MULTI-VITAMINS Tabs      Take 1 tablet by mouth    Postsurgical hypothyroidism, Thyroid cancer (H)

## 2017-09-12 NOTE — NURSING NOTE
Faxed over a signed COURTNEY to [facility] (per patient) for the release of chest CT images on CD to be mailed to us per request of Dr. Horn on 9/12/17 at 14:25.  Will upload to PACS when CD arrives.       Analia Ferrell, CMA

## 2017-09-12 NOTE — NURSING NOTE
Chief Complaint   Patient presents with     RECHECK     F/U THYROID CANCER     Iwona Hood, CMA  Endocrinology & Diabetes 3G

## 2017-09-12 NOTE — PROGRESS NOTES
Endocrinology Consult Note    Attending ASSESSMENT/PLAN:     1.  Papillary thyroid carcinoma, 1.8 cm, + ETE, LN negative, pT3, pN0, pMx; She has been treated with total Tx. TNM stage I or II; MACIS < 6 assuming no distant mets.  She has not had RRA.  Labs today;    In the past I had Suggested 123I TBS as next step but she hasn't had it.    Addendum : outside images I requested are not yet on PACS as of today  Neck US  Chest CT    2.  Post surgical hypothyroidism. Treat to target TSH < 0.4.Unstable due to  LT4 dose increase since last appt.  Current status unknown  Labs today.    3.  Lung nodules thought to be granulomatous disease by CXR and CT.   The CD with what I thought was 10/16 chest CT is still not uploaded to PACS (I believe I sent CD to radiology for this 3/17, though perhaps what  sent didn't incde the 10/16 CT, I hadn't itemized what was on the CD before sending it)  Chest CT here to resolve this.      Karime Horn MD      Cc/HISTORY OF PRESENT ILLNESS 42 year old woman returns for follow up of papillary thyroid cancer and post surgical hypothyoridism. She was last seen by me 3/6/17.  At that time she was on  lT4 dose 150 mcg/day since 9/14/16.  I increased the dose to 150 * 7.5/week, divided. She was to have follow up labs 2 months later, but didn't.    On 5/5/15 she underwent total Tx removing  papillary thyroid carcinoma 1.8 cm with + ETE; LN negative 0/2  pT3pN0,pMx  MACIS  Total 4.64 assuming complete and no distant    We have the following tumor marker data:  6/16/14: Tg < 0.1, JOE < 0.4, TSH 0.03  8/18/15: Tg < 0.1, JOE < 0.4, TSH 0.47  8/16/16: Tg < 0.1, JOE < 0.4, TSH 0.01  3/6/17: Tg 0.14, JOE < 0.4, TSH 0.76, free T4 1.33 on LT4 150 mcg/day -     images on PACS  9/9/13 chest CT: thyroid asymmetrical, right>  L; dense calcs isthmus region  8/18/16 neck US: no abnormal lymph nodes .   9/15/17 neck US (following appt): negative     10/16 she went to ED at [facility].  She had CT scan  of the chest.  It showed an abnormality in the neck. She thinks she had an US after that.     REVIEW OF SYSTEMS   Weight loss is due to significant effort - healthy diet, exercise and PT for the hips.  She is eating salad bid x 3 months.    Throat is doing fine, voice is unchanged  Cardiac: negative  Respiratory: negative  GI:  A lot of GI issues lately -upset stomach and diarrhea  She has been on concerta only a few weeks - helping a little bit, for treatment of ADHD-   Still has Mirena - gets periods on this every month  Rushed to get here today    Past Medical History  Past Medical History:   Diagnosis Date     Contact dermatitis and other eczema, due to unspecified cause      Ganglion, unspecified      Migraine, unspecified, without mention of intractable migraine without mention of status migrainosus      Nausea alone      Other specified disorders of biliary tract      Pain in joint, site unspecified      PONV (postoperative nausea and vomiting)      Streptococcal sore throat      Unspecified gastritis and gastroduodenitis without mention of hemorrhage      Unspecified vitamin D deficiency      Past Surgical History:   Procedure Laterality Date     APPENDECTOMY       LAPAROSCOPIC CHOLECYSTECTOMY  9/6/2013    Procedure: LAPAROSCOPIC CHOLECYSTECTOMY;  LAPAROSCOPIC CHOLECYSTECTOMY;  Surgeon: Chaparro Benton MD;  Location: [facility]     ORTHOPEDIC SURGERY      knee arthroscopy, arthrocentesis aspiration of ganglion cyst of left wrist     THYROIDECTOMY N/A 5/5/2015    Procedure: THYROIDECTOMY;  Surgeon: Rosenda Coughlin MD;  Location:  OR       Medications    Current Outpatient Prescriptions   Medication Sig Dispense Refill     methylphenidate ER (CONCERTA) 36 MG CR tablet        levothyroxine (SYNTHROID/LEVOTHROID) 150 MCG tablet Monday-Saturday: (1 tablet/day);   Sunday: (1.5 tablet) 96 tablet 3     Multiple Vitamin (MULTI-VITAMINS) TABS Take 1 tablet by mouth         Allergies  No Known  "Allergies    Family History  family history is negative for Unknown/Adopted.    Social History  Social History   Substance Use Topics     Smoking status: Never Smoker     Smokeless tobacco: Never Used     Alcohol use No     ; 4 kids; exercise is walking with neighborhood group x 6 months--3 miles 3+ times/week; she is doing light weight training; PT is 3 times/week -- bike, exercises.         Physical Exam  /83  Pulse 121  Ht 1.626 m (5' 4\")  Wt 102.1 kg (225 lb)  BMI 38.62 kg/m2  Body mass index is 38.62 kg/(m^2).  GENERAL : pleasant young woman  In no apparent distress.     SKIN: Normal color,temperature, texture.  No grosshirsutism, alopecia  .    EYES: PER,  No scleral icterus,  No proptosis, conjunctival redness, stare, retraction  NECK: No visible masses. No palpable adenopathy, or masses.   RESP: Lungs clear to auscultation bilaterally  CARDIAC: hyperdynamic Regular rate and rhythm, normal S1 S2, without murmurs, rubs or gallops     NEURO: awake, alert, responds appropriately to questions.Moves all extremities; Gait normal.  +  tremor of the outstretched hand.  EXTREMITIES: No clubbing, cyanosis or edema.    DATA REVIEW    Results for BLAISE BELTRAN (MRN [id number]) as of 9/15/2017 17:30   Ref. Range 9/15/2017 13:19 9/15/2017 13:40   T4 Free Latest Ref Range: 0.76 - 1.46 ng/dL 1.15    TSH Latest Ref Range: 0.40 - 4.00 mU/L <0.01 (L)    CT CHEST W/O CONTRAST Unknown  Rpt     EXAMINATION: CT CHEST W/O CONTRAST, 9/15/2017 1:40 PM     CLINICAL HISTORY: Postprocedural hypothyroidism, Malignant neoplasm of  thyroid gland, Other nonspecific abnormal finding of lung field     COMPARISON: CT 9/9/2013.     TECHNIQUE: CT imaging obtained through the chest without contrast.  Coronal and axial MIP reformatted images obtained.      CONTRAST:  none.     FINDINGS:     Lines and tubes: None.     Mediastinum: Postsurgical changes of the thyroidectomy. Central  tracheobronchial tree is patent. Heart " size is normal. No pericardial  effusion.  Normal thoracic vasculature. No thoracic lymphadenopathy.  Calcified mediastinal and hilar lymph nodes.     Lungs: Stable 4 mm subsegmental nodule, immediately adjacent to the  right major fissure in the right lower lobe (series 4, image 188).  Unchanged 2 mm pulmonary nodule in the posterior right upper lobe  (series 4, image 93). Unchanged 2 mm pulmonary nodule in the left  upper lobe (series 4, image 121). No new pulmonary nodules. No  suspicious pleural thickening. Scattered calcified granulomas. No  consolidation. No pleural effusion or pneumothorax.     Bones and soft tissues: No suspicious bone findings.      Upper abdomen: Limited. Cholecystectomy clips.         IMPRESSION:   1. Post surgical changes of the thyroidectomy. No evidence of  metastatic disease in the chest.  2. Evidence of a prior granulomatous infection.      I have personally reviewed the examination and initial interpretation  and I agree with the findings.     JESSICA NIELSON MD    EXAMINATION: US HEAD NECK SOFT TISSUE, 9/15/2017 2:06 PM      COMPARISON: Ultrasound neck dated 8/18/2016     HISTORY: Postsurgical hypothyroidism, papillary thyroid carcinoma     FINDINGS:      Lymph nodes are measured bilaterally with measurements given in  craniocaudal, transverse and AP dimensions as follows:     Right:  Level 1: None  Level 2: None  Level 3: None  Level 4: None  Level 5: None  Level 6: None     Left:  Level 1: None  Level 2: Single benign-appearing lymph node with fatty hilum is seen  measuring 1 x 0.6 x 0.5 cm; previously measured 1.1 x 0.6 x 0.6 cm  Level 3: None  Level 4: None  Level 5: None  Level 6: None         IMPRESSION: Single benign-appearing lymph node at level 2 on the left,  significantly changed compared with previous ultrasound dated  8/18/2016.     I have personally reviewed the examination and initial interpretation  and I agree with the findings.     MORRIS YEBOAH MD

## 2017-09-15 DIAGNOSIS — C73 THYROID CANCER (H): ICD-10-CM

## 2017-09-15 DIAGNOSIS — E89.0 POSTSURGICAL HYPOTHYROIDISM: ICD-10-CM

## 2017-09-15 DIAGNOSIS — R91.8 MULTIPLE LUNG NODULES ON CT: ICD-10-CM

## 2017-09-15 LAB
T4 FREE SERPL-MCNC: 1.15 NG/DL (ref 0.76–1.46)
TSH SERPL DL<=0.005 MIU/L-ACNC: <0.01 MU/L (ref 0.4–4)

## 2017-09-25 LAB — LAB SCANNED RESULT: NORMAL

## 2017-09-25 NOTE — PROGRESS NOTES
Receipt of imaging reports from [facility]    1/6/17 chest abdomen pelvis CT with contrast, dissection protocol. I will send the report to be scanned to the EMR.  Lungs showed scattered calcified granulomata  Throughout both lungs.    Right adnexa had incompletely imaged 3.8 x 4.5 cm low attenuating structure

## 2017-12-03 ENCOUNTER — HEALTH MAINTENANCE LETTER (OUTPATIENT)
Age: 42
End: 2017-12-03

## 2018-02-28 ASSESSMENT — ENCOUNTER SYMPTOMS
POLYDIPSIA: 0
DEPRESSION: 0
DIARRHEA: 1
RECTAL PAIN: 0
INSOMNIA: 1
FLANK PAIN: 0
VOMITING: 0
POLYPHAGIA: 0
HOT FLASHES: 1
BLOATING: 1
NAUSEA: 1
SKIN CHANGES: 0
NERVOUS/ANXIOUS: 0
PANIC: 1
FEVER: 0
POOR WOUND HEALING: 1
DECREASED CONCENTRATION: 1
NIGHT SWEATS: 1
FATIGUE: 1
DECREASED APPETITE: 1
NAIL CHANGES: 0
CONSTIPATION: 0
BOWEL INCONTINENCE: 0
ABDOMINAL PAIN: 1
WEIGHT GAIN: 0
HALLUCINATIONS: 0
INCREASED ENERGY: 1
CHILLS: 0
HEARTBURN: 1
WEIGHT LOSS: 1
JAUNDICE: 0
DIFFICULTY URINATING: 0
DECREASED LIBIDO: 0
DYSURIA: 0
ALTERED TEMPERATURE REGULATION: 1
BLOOD IN STOOL: 0
HEMATURIA: 0

## 2018-03-05 ENCOUNTER — OFFICE VISIT (OUTPATIENT)
Dept: ENDOCRINOLOGY | Facility: CLINIC | Age: 43
End: 2018-03-05
Payer: COMMERCIAL

## 2018-03-05 VITALS
DIASTOLIC BLOOD PRESSURE: 78 MMHG | HEIGHT: 64 IN | SYSTOLIC BLOOD PRESSURE: 130 MMHG | WEIGHT: 207 LBS | BODY MASS INDEX: 35.34 KG/M2 | HEART RATE: 80 BPM

## 2018-03-05 DIAGNOSIS — R91.8 MULTIPLE LUNG NODULES ON CT: ICD-10-CM

## 2018-03-05 DIAGNOSIS — E89.0 POSTSURGICAL HYPOTHYROIDISM: ICD-10-CM

## 2018-03-05 DIAGNOSIS — C73 THYROID CANCER (H): ICD-10-CM

## 2018-03-05 DIAGNOSIS — C73 THYROID CANCER (H): Primary | ICD-10-CM

## 2018-03-05 LAB
T4 FREE SERPL-MCNC: 1.41 NG/DL (ref 0.76–1.46)
TSH SERPL DL<=0.005 MIU/L-ACNC: <0.01 MU/L (ref 0.4–4)

## 2018-03-05 RX ORDER — LISDEXAMFETAMINE DIMESYLATE 50 MG/1
CAPSULE ORAL
COMMUNITY
Start: 2018-02-15

## 2018-03-05 RX ORDER — DEXTROAMPHETAMINE SACCHARATE, AMPHETAMINE ASPARTATE, DEXTROAMPHETAMINE SULFATE AND AMPHETAMINE SULFATE 2.5; 2.5; 2.5; 2.5 MG/1; MG/1; MG/1; MG/1
TABLET ORAL
COMMUNITY
Start: 2018-02-15 | End: 2023-05-01

## 2018-03-05 ASSESSMENT — PAIN SCALES - GENERAL: PAINLEVEL: NO PAIN (0)

## 2018-03-05 NOTE — LETTER
3/5/2018       RE: Sinai Shelley  [address]  [facility] [address]     Dear Colleague,    Thank you for referring your patient, Sinai Shelley, to the [facility] ENDOCRINOLOGY at [facility]. Please see a copy of my visit note below.    Endocrinology Consult Note    Attending ASSESSMENT/PLAN:     1.  Papillary thyroid carcinoma, 1.8 cm, + ETE, LN negative, pT3, pN0, pMx; She has been treated with total Tx. TNM stage I or II; MACIS < 6 assuming no distant mets.  She has not had RRA.  Labs today;    In the past I had Suggested 123I TBS as next step but she hasn't had it. We discussed again today and decided against it for now.      2.  Post surgical hypothyroidism. Treat to target TSH < 0.4.Unstable due to  LT4 dose increase since last appt.  Current status unknown  Labs today.    3.  Lung nodules up to 4 mm  thought to be granulomatous disease by CXR and CT.     Karime Horn MD      Cc/HISTORY OF PRESENT ILLNESS 42 year old woman returns for follow up of papillary thyroid cancer and post surgical hypothyoridism. She was last seen by me 9/12/17.  At that time she was on  lT4  150 * 7.5/week, divided. Today she says she is on 150 * 7.5/week, divided.     Her treatment course has been as follows:   5/5/15  total Tx removing  papillary thyroid carcinoma 1.8 cm with + ETE; LN negative 0/2  pT3pN0,pMx  MACIS 4.64 assuming complete and no distant  She has not had 131I RRA.     We have the following tumor marker data:  6/16/14: Tg < 0.1, JOE < 0.4, TSH 0.03  8/18/15: Tg < 0.1, JOE < 0.4, TSH 0.47  8/16/16: Tg < 0.1, JOE < 0.4, TSH 0.01  3/6/17: Tg 0.14, JOE < 0.4, TSH 0.76, free T4 1.33 on LT4 150 mcg/day -   9/15/17: Tg < 0.1, JOE < 0.4, TSH < 0.01, free T4 1.15    images on PACS  9/9/13 chest CT: thyroid asymmetrical, right>  L; dense calcs isthmus region  8/18/16 neck US: no abnormal lymph nodes .   9/15/17 neck US : negative   9/15/17 chest CT - stable  up to 4 mm lung nodules. - thought to be prior granulomatous infection.       REVIEW OF SYSTEMS   Weight loss by lifestyle change - intermittent fasting - she goes from 8 PM to 1 PM the next day without eating.   She can tell a difference on Sundays - she  Feels better on Sundays when she takes the extra 1/2 tablet.   Cardiac: negative  Respiratory: negative  GI: stomach is very loud every time she eats, can be heard across the room .  BMs fairly normal.    Planning to see urogyn - stress incontinence.        Past Medical History  Past Medical History:   Diagnosis Date     Contact dermatitis and other eczema, due to unspecified cause      Ganglion, unspecified      Migraine, unspecified, without mention of intractable migraine without mention of status migrainosus      Nausea alone      Other specified disorders of biliary tract      Pain in joint, site unspecified      PONV (postoperative nausea and vomiting)      Streptococcal sore throat      Unspecified gastritis and gastroduodenitis without mention of hemorrhage      Unspecified vitamin D deficiency      Past Surgical History:   Procedure Laterality Date     APPENDECTOMY       LAPAROSCOPIC CHOLECYSTECTOMY  9/6/2013    Procedure: LAPAROSCOPIC CHOLECYSTECTOMY;  LAPAROSCOPIC CHOLECYSTECTOMY;  Surgeon: Chaparro Benton MD;  Location: [facility]     ORTHOPEDIC SURGERY      knee arthroscopy, arthrocentesis aspiration of ganglion cyst of left wrist     THYROIDECTOMY N/A 5/5/2015    Procedure: THYROIDECTOMY;  Surgeon: Rosenda Coughlin MD;  Location: [facility] OR       Medications    Current Outpatient Prescriptions   Medication Sig Dispense Refill     methylphenidate ER (CONCERTA) 36 MG CR tablet        levothyroxine (SYNTHROID/LEVOTHROID) 150 MCG tablet Monday-Saturday: (1 tablet/day);   Sunday: (1.5 tablet) 96 tablet 3     Multiple Vitamin (MULTI-VITAMINS) TABS Take 1 tablet by mouth         Allergies  No Known Allergies    Family History  family history is negative  for Unknown/Adopted.    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  Walking less in the last months; no weight training.     Physical Exam  There were no vitals taken for this visit.  There is no height or weight on file to calculate BMI.  GENERAL : pleasant young woman  In no apparent distress.   She is noticeably thinner.    SKIN: Normal color,temperature, texture.  No gross hirsutism, alopecia  .    EYES: PER,  No scleral icterus,  No proptosis, conjunctival redness, stare, retraction  NECK: No visible masses. No palpable adenopathy, or masses.   RESP: Lungs clear to auscultation bilaterally  CARDIAC: Regular rate and rhythm, normal S1 S2, without murmurs, rubs or gallops     NEURO: awake, alert, responds appropriately to questions.Moves all extremities; Gait normal.  +  tremor of the outstretched hand.  EXTREMITIES: No clubbing, cyanosis or edema.    DATA REVIEW      EXAMINATION: CT CHEST W/O CONTRAST, 9/15/2017 1:40 PM     CLINICAL HISTORY: Postprocedural hypothyroidism, Malignant neoplasm of  thyroid gland, Other nonspecific abnormal finding of lung field     COMPARISON: CT 9/9/2013.     TECHNIQUE: CT imaging obtained through the chest without contrast.  Coronal and axial MIP reformatted images obtained.      CONTRAST:  none.     FINDINGS:     Lines and tubes: None.     Mediastinum: Postsurgical changes of the thyroidectomy. Central  tracheobronchial tree is patent. Heart size is normal. No pericardial  effusion.  Normal thoracic vasculature. No thoracic lymphadenopathy.  Calcified mediastinal and hilar lymph nodes.     Lungs: Stable 4 mm subsegmental nodule, immediately adjacent to the  right major fissure in the right lower lobe (series 4, image 188).  Unchanged 2 mm pulmonary nodule in the posterior right upper  lobe  (series 4, image 93). Unchanged 2 mm pulmonary nodule in the left  upper lobe (series 4, image 121). No new pulmonary nodules. No  suspicious pleural thickening. Scattered calcified granulomas. No  consolidation. No pleural effusion or pneumothorax.     Bones and soft tissues: No suspicious bone findings.      Upper abdomen: Limited. Cholecystectomy clips.         IMPRESSION:   1. Post surgical changes of the thyroidectomy. No evidence of  metastatic disease in the chest.  2. Evidence of a prior granulomatous infection.      I have personally reviewed the examination and initial interpretation  and I agree with the findings.     JESSICA NIELSON MD    EXAMINATION: US HEAD NECK SOFT TISSUE, 9/15/2017 2:06 PM      COMPARISON: Ultrasound neck dated 8/18/2016     HISTORY: Postsurgical hypothyroidism, papillary thyroid carcinoma     FINDINGS:      Lymph nodes are measured bilaterally with measurements given in  craniocaudal, transverse and AP dimensions as follows:     Right:  Level 1: None  Level 2: None  Level 3: None  Level 4: None  Level 5: None  Level 6: None     Left:  Level 1: None  Level 2: Single benign-appearing lymph node with fatty hilum is seen  measuring 1 x 0.6 x 0.5 cm; previously measured 1.1 x 0.6 x 0.6 cm  Level 3: None  Level 4: None  Level 5: None  Level 6: None         IMPRESSION: Single benign-appearing lymph node at level 2 on the left,  significantly changed compared with previous ultrasound dated  8/18/2016.     I have personally reviewed the examination and initial interpretation  and I agree with the findings.     MORRIS YEBOAH MD

## 2018-03-05 NOTE — NURSING NOTE
"Chief Complaint   Patient presents with     RECHECK     Thyroid cancer       Initial /78  Temp (!) 80  F (26.7  C)  Ht 1.634 m (5' 4.33\")  Wt 93.9 kg (207 lb)  BMI 35.17 kg/m2 Estimated body mass index is 35.17 kg/(m^2) as calculated from the following:    Height as of this encounter: 1.634 m (5' 4.33\").    Weight as of this encounter: 93.9 kg (207 lb).  Medication Reconciliation: complete    "

## 2018-03-05 NOTE — PROGRESS NOTES
Endocrinology Consult Note    Attending ASSESSMENT/PLAN:     1.  Papillary thyroid carcinoma, 1.8 cm, + ETE, LN negative, pT3, pN0, pMx; She has been treated with total Tx. TNM stage I or II; MACIS < 6 assuming no distant mets.  She has not had RRA.  Labs today;    In the past I had Suggested 123I TBS as next step but she hasn't had it. We discussed again today and decided against it for now.      2.  Post surgical hypothyroidism. Treat to target TSH < 0.4.Unstable due to  LT4 dose increase since last appt.  Current status unknown  Labs today.    3.  Lung nodules up to 4 mm  thought to be granulomatous disease by CXR and CT.     Karime Horn MD      Cc/HISTORY OF PRESENT ILLNESS 42 year old woman returns for follow up of papillary thyroid cancer and post surgical hypothyoridism. She was last seen by me 9/12/17.  At that time she was on  lT4  150 * 7.5/week, divided. Today she says she is on 150 * 7.5/week, divided.     Her treatment course has been as follows:   5/5/15  total Tx removing  papillary thyroid carcinoma 1.8 cm with + ETE; LN negative 0/2  pT3pN0,pMx  MACIS 4.64 assuming complete and no distant  She has not had 131I RRA.     We have the following tumor marker data:  6/16/14: Tg < 0.1, JOE < 0.4, TSH 0.03  8/18/15: Tg < 0.1, JOE < 0.4, TSH 0.47  8/16/16: Tg < 0.1, JOE < 0.4, TSH 0.01  3/6/17: Tg 0.14, JOE < 0.4, TSH 0.76, free T4 1.33 on LT4 150 mcg/day -   9/15/17: Tg < 0.1, JOE < 0.4, TSH < 0.01, free T4 1.15  3/5/18: Tg < 0.1, JOE < 0.4, TSH < 0.01, free T4 1.41 - the results followed the appt and were not discussed at the appt.     images on PACS  9/9/13 chest CT: thyroid asymmetrical, right>  L; dense calcs isthmus region  8/18/16 neck US: no abnormal lymph nodes .   9/15/17 neck US : negative   9/15/17 chest CT - stable up to 4 mm lung nodules. - thought to be prior granulomatous infection.       REVIEW OF SYSTEMS   Weight loss by lifestyle change - intermittent fasting - she goes from 8 PM to  1 PM the next day without eating.   She can tell a difference on Sundays - she  Feels better on Sundays when she takes the extra 1/2 tablet.   Cardiac: negative  Respiratory: negative  GI: stomach is very loud every time she eats, can be heard across the room .  BMs fairly normal.    Planning to see urogyn - stress incontinence.        Past Medical History  Past Medical History:   Diagnosis Date     Contact dermatitis and other eczema, due to unspecified cause      Ganglion, unspecified      Migraine, unspecified, without mention of intractable migraine without mention of status migrainosus      Nausea alone      Other specified disorders of biliary tract      Pain in joint, site unspecified      PONV (postoperative nausea and vomiting)      Streptococcal sore throat      Unspecified gastritis and gastroduodenitis without mention of hemorrhage      Unspecified vitamin D deficiency      Past Surgical History:   Procedure Laterality Date     APPENDECTOMY       LAPAROSCOPIC CHOLECYSTECTOMY  9/6/2013    Procedure: LAPAROSCOPIC CHOLECYSTECTOMY;  LAPAROSCOPIC CHOLECYSTECTOMY;  Surgeon: Chaparro Benton MD;  Location: [facility]     ORTHOPEDIC SURGERY      knee arthroscopy, arthrocentesis aspiration of ganglion cyst of left wrist     THYROIDECTOMY N/A 5/5/2015    Procedure: THYROIDECTOMY;  Surgeon: Rosenda Coughlin MD;  Location:  OR       Medications    Current Outpatient Prescriptions   Medication Sig Dispense Refill     methylphenidate ER (CONCERTA) 36 MG CR tablet        levothyroxine (SYNTHROID/LEVOTHROID) 150 MCG tablet Monday-Saturday: (1 tablet/day);   Sunday: (1.5 tablet) 96 tablet 3     Multiple Vitamin (MULTI-VITAMINS) TABS Take 1 tablet by mouth         Allergies  No Known Allergies    Family History  family history is negative for Unknown/Adopted.    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  Walking less in the last months; no weight training.     Physical Exam  There were no vitals taken for this visit.  There is no height or weight on file to calculate BMI.  GENERAL : pleasant young woman  In no apparent distress.   She is noticeably thinner.    SKIN: Normal color,temperature, texture.  No gross hirsutism, alopecia  .    EYES: PER,  No scleral icterus,  No proptosis, conjunctival redness, stare, retraction  NECK: No visible masses. No palpable adenopathy, or masses.   RESP: Lungs clear to auscultation bilaterally  CARDIAC: Regular rate and rhythm, normal S1 S2, without murmurs, rubs or gallops     NEURO: awake, alert, responds appropriately to questions.Moves all extremities; Gait normal.  +  tremor of the outstretched hand.  EXTREMITIES: No clubbing, cyanosis or edema.    DATA REVIEW      EXAMINATION: CT CHEST W/O CONTRAST, 9/15/2017 1:40 PM     CLINICAL HISTORY: Postprocedural hypothyroidism, Malignant neoplasm of  thyroid gland, Other nonspecific abnormal finding of lung field     COMPARISON: CT 9/9/2013.     TECHNIQUE: CT imaging obtained through the chest without contrast.  Coronal and axial MIP reformatted images obtained.      CONTRAST:  none.     FINDINGS:     Lines and tubes: None.     Mediastinum: Postsurgical changes of the thyroidectomy. Central  tracheobronchial tree is patent. Heart size is normal. No pericardial  effusion.  Normal thoracic vasculature. No thoracic lymphadenopathy.  Calcified mediastinal and hilar lymph nodes.     Lungs: Stable 4 mm subsegmental nodule, immediately adjacent to the  right major fissure in the right lower lobe (series 4, image 188).  Unchanged 2 mm pulmonary nodule in the posterior right upper lobe  (series 4, image 93). Unchanged 2 mm pulmonary nodule in the left  upper lobe (series 4, image 121). No new pulmonary nodules. No  suspicious pleural thickening. Scattered  calcified granulomas. No  consolidation. No pleural effusion or pneumothorax.     Bones and soft tissues: No suspicious bone findings.      Upper abdomen: Limited. Cholecystectomy clips.         IMPRESSION:   1. Post surgical changes of the thyroidectomy. No evidence of  metastatic disease in the chest.  2. Evidence of a prior granulomatous infection.      I have personally reviewed the examination and initial interpretation  and I agree with the findings.     JESSICA NIELSON MD    EXAMINATION: US HEAD NECK SOFT TISSUE, 9/15/2017 2:06 PM      COMPARISON: Ultrasound neck dated 8/18/2016     HISTORY: Postsurgical hypothyroidism, papillary thyroid carcinoma     FINDINGS:      Lymph nodes are measured bilaterally with measurements given in  craniocaudal, transverse and AP dimensions as follows:     Right:  Level 1: None  Level 2: None  Level 3: None  Level 4: None  Level 5: None  Level 6: None     Left:  Level 1: None  Level 2: Single benign-appearing lymph node with fatty hilum is seen  measuring 1 x 0.6 x 0.5 cm; previously measured 1.1 x 0.6 x 0.6 cm  Level 3: None  Level 4: None  Level 5: None  Level 6: None         IMPRESSION: Single benign-appearing lymph node at level 2 on the left,  significantly changed compared with previous ultrasound dated  8/18/2016.     I have personally reviewed the examination and initial interpretation  and I agree with the findings.     MORRIS YEBOAH MD

## 2018-03-13 DIAGNOSIS — C73 THYROID CANCER (H): ICD-10-CM

## 2018-03-13 DIAGNOSIS — E89.0 POSTSURGICAL HYPOTHYROIDISM: ICD-10-CM

## 2018-03-13 LAB — LAB SCANNED RESULT: NORMAL

## 2018-03-13 RX ORDER — LEVOTHYROXINE SODIUM 150 UG/1
150 TABLET ORAL DAILY
Qty: 90 TABLET | Refills: 3 | Status: SHIPPED | OUTPATIENT
Start: 2018-03-13 | End: 2019-03-21

## 2018-04-09 DIAGNOSIS — C73 THYROID CANCER (H): ICD-10-CM

## 2018-04-09 DIAGNOSIS — E89.0 POSTSURGICAL HYPOTHYROIDISM: ICD-10-CM

## 2018-04-10 RX ORDER — LEVOTHYROXINE SODIUM 150 UG/1
TABLET ORAL
Qty: 96 TABLET | Refills: 0 | OUTPATIENT
Start: 2018-04-10

## 2019-03-21 DIAGNOSIS — E89.0 POSTSURGICAL HYPOTHYROIDISM: ICD-10-CM

## 2019-03-21 DIAGNOSIS — C73 THYROID CANCER (H): ICD-10-CM

## 2019-03-25 NOTE — TELEPHONE ENCOUNTER
LEVOTHYROXINE 0.150MG (150MCG) TAB  ke 1 tablet (150 mcg) by mouth daily   Last Written Prescription Date:  3/13/2018  Last Fill Quantity: 90,   # refills: 3  Last Office Visit : 3/5/2018  Future Office visit:  None.    Scheduling has been notified to contact the pt for appointment.  TSH plan as of 3/5/2018 :Treat to target TSH < 0.4.Unstable due to  LT4 dose increase since last appt.  Current status unknown  Results for orders placed or performed in visit on 03/05/18   TSH   Result Value Ref Range    TSH <0.01 (L) 0.40 - 4.00 mU/L   T4 free   Result Value Ref Range    T4 Free 1.41 0.76 - 1.46 ng/dL   Thyroglobulin (Total, antibody & recovery %)   Result Value Ref Range    Lab Scanned Result THYROG-Scanned            Routing refill request to provider for review/approval because:  Protocol. LVD and last TSH both 3/5/2018. Appointments cancelled x 3.

## 2019-03-26 RX ORDER — LEVOTHYROXINE SODIUM 150 UG/1
150 TABLET ORAL DAILY
Qty: 90 TABLET | Refills: 0 | Status: SHIPPED | OUTPATIENT
Start: 2019-03-26 | End: 2019-06-27

## 2019-06-25 DIAGNOSIS — C73 THYROID CANCER (H): ICD-10-CM

## 2019-06-25 DIAGNOSIS — E89.0 POSTSURGICAL HYPOTHYROIDISM: ICD-10-CM

## 2019-06-27 RX ORDER — LEVOTHYROXINE SODIUM 150 UG/1
150 TABLET ORAL DAILY
Qty: 90 TABLET | Refills: 0 | Status: SHIPPED | OUTPATIENT
Start: 2019-06-27 | End: 2019-09-25

## 2019-06-27 NOTE — TELEPHONE ENCOUNTER
levothyroxine (SYNTHROID/LEVOTHROID) 150 MCG tablet    Last Written Prescription Date:  3/26/19  Last Fill Quantity: 90,   # refills: 0  Last Office Visit : 11/6/18  Future Office visit:  None    Routing refill request to provider for review/approval because: provider preference

## 2019-09-25 DIAGNOSIS — E89.0 POSTSURGICAL HYPOTHYROIDISM: ICD-10-CM

## 2019-09-25 DIAGNOSIS — C73 THYROID CANCER (H): ICD-10-CM

## 2019-09-26 NOTE — TELEPHONE ENCOUNTER
LEVOTHYROXINE 0.150MG (150MCG) TAB   Last Written Prescription Date:  6/27/2019  Last Fill Quantity: 90,   # refills: 0  Last Office Visit : 3/5/2018  Future Office visit:  One year.  Scheduling has been notified to contact the pt for appointment.      Routing refill request to provider for review/approval because:  Provider preference    Results for orders placed or performed in visit on 03/05/18   TSH   Result Value Ref Range    TSH <0.01 (L) 0.40 - 4.00 mU/L   T4 free   Result Value Ref Range    T4 Free 1.41 0.76 - 1.46 ng/dL   Thyroglobulin (Total, antibody & recovery %)   Result Value Ref Range    Lab Scanned Result THYROG-Scanned      No more recent labs in CareEverywhere noted.

## 2019-09-27 RX ORDER — LEVOTHYROXINE SODIUM 150 UG/1
TABLET ORAL
Qty: 90 TABLET | Refills: 3 | Status: SHIPPED | OUTPATIENT
Start: 2019-09-27 | End: 2020-04-06

## 2019-10-04 ENCOUNTER — ANESTHESIA EVENT (OUTPATIENT)
Dept: SURGERY | Facility: CLINIC | Age: 44
DRG: 489 | End: 2019-10-04
Payer: COMMERCIAL

## 2019-10-07 RX ORDER — PANTOPRAZOLE SODIUM 40 MG/1
40 TABLET, DELAYED RELEASE ORAL DAILY
COMMUNITY

## 2019-10-07 RX ORDER — SPIRONOLACTONE 25 MG/1
125 TABLET ORAL DAILY PRN
COMMUNITY
End: 2023-05-01

## 2019-10-07 NOTE — OR NURSING
In PAN Call, patient shared that she has been working with Dr. Isaiah Angel on an Anesthesia Plan, to deal with her PONV and her history of experiencing pain during surgery despite block. Dr. Angel has a certain type of block he recommends using, and patient states he was either going to come to [facility] to do patient's anesthesia/block on DOS, or communicate to appropriate Anesthesia staff his recommendations.   MAN RN paged Dr. Isaiah Angel, and spoke with him to remind him of this case.  He is aware.

## 2019-10-09 ENCOUNTER — ANESTHESIA (OUTPATIENT)
Dept: SURGERY | Facility: CLINIC | Age: 44
DRG: 489 | End: 2019-10-09
Payer: COMMERCIAL

## 2019-10-09 ENCOUNTER — HOSPITAL ENCOUNTER (OUTPATIENT)
Facility: CLINIC | Age: 44
Setting detail: OBSERVATION
Discharge: HOME HEALTH | DRG: 489 | End: 2019-10-11
Attending: ORTHOPAEDIC SURGERY | Admitting: ORTHOPAEDIC SURGERY
Payer: COMMERCIAL

## 2019-10-09 ENCOUNTER — APPOINTMENT (OUTPATIENT)
Dept: GENERAL RADIOLOGY | Facility: CLINIC | Age: 44
DRG: 489 | End: 2019-10-09
Attending: ORTHOPAEDIC SURGERY
Payer: COMMERCIAL

## 2019-10-09 DIAGNOSIS — Z98.890 S/P LEFT KNEE SURGERY: ICD-10-CM

## 2019-10-09 DIAGNOSIS — M25.562 ACUTE PAIN OF LEFT KNEE: ICD-10-CM

## 2019-10-09 DIAGNOSIS — M94.9 CHONDRAL LESION: ICD-10-CM

## 2019-10-09 DIAGNOSIS — Z98.890 STATUS POST OSTEOTOMY: Primary | ICD-10-CM

## 2019-10-09 PROBLEM — M25.569 KNEE PAIN: Status: ACTIVE | Noted: 2019-10-09

## 2019-10-09 LAB
GLUCOSE BLDC GLUCOMTR-MCNC: 98 MG/DL (ref 70–99)
HCG UR QL: NEGATIVE

## 2019-10-09 PROCEDURE — 40000170 ZZH STATISTIC PRE-PROCEDURE ASSESSMENT II: Performed by: ORTHOPAEDIC SURGERY

## 2019-10-09 PROCEDURE — 25000128 H RX IP 250 OP 636: Performed by: ANESTHESIOLOGY

## 2019-10-09 PROCEDURE — 25000128 H RX IP 250 OP 636: Performed by: ORTHOPAEDIC SURGERY

## 2019-10-09 PROCEDURE — 12000001 ZZH R&B MED SURG/OB UMMC

## 2019-10-09 PROCEDURE — 40000278 XR SURGERY CARM FLUORO LESS THAN 5 MIN: Mod: TC

## 2019-10-09 PROCEDURE — C1713 ANCHOR/SCREW BN/BN,TIS/BN: HCPCS | Performed by: ORTHOPAEDIC SURGERY

## 2019-10-09 PROCEDURE — 25000128 H RX IP 250 OP 636: Performed by: NURSE ANESTHETIST, CERTIFIED REGISTERED

## 2019-10-09 PROCEDURE — 81025 URINE PREGNANCY TEST: CPT | Performed by: ANESTHESIOLOGY

## 2019-10-09 PROCEDURE — 37000009 ZZH ANESTHESIA TECHNICAL FEE, EACH ADDTL 15 MIN: Performed by: ORTHOPAEDIC SURGERY

## 2019-10-09 PROCEDURE — 27211024 ZZHC OR SUPPLY OTHER OPNP: Performed by: ORTHOPAEDIC SURGERY

## 2019-10-09 PROCEDURE — 36000066 ZZH SURGERY LEVEL 4 W FLUORO 1ST 30 MIN - UMMC: Performed by: ORTHOPAEDIC SURGERY

## 2019-10-09 PROCEDURE — 25800025 ZZH RX 258: Performed by: ORTHOPAEDIC SURGERY

## 2019-10-09 PROCEDURE — 25000132 ZZH RX MED GY IP 250 OP 250 PS 637: Performed by: PHYSICIAN ASSISTANT

## 2019-10-09 PROCEDURE — 25000125 ZZHC RX 250: Performed by: ORTHOPAEDIC SURGERY

## 2019-10-09 PROCEDURE — C1762 CONN TISS, HUMAN(INC FASCIA): HCPCS | Performed by: ORTHOPAEDIC SURGERY

## 2019-10-09 PROCEDURE — 36000064 ZZH SURGERY LEVEL 4 EA 15 ADDTL MIN - UMMC: Performed by: ORTHOPAEDIC SURGERY

## 2019-10-09 PROCEDURE — 37000008 ZZH ANESTHESIA TECHNICAL FEE, 1ST 30 MIN: Performed by: ORTHOPAEDIC SURGERY

## 2019-10-09 PROCEDURE — 25000132 ZZH RX MED GY IP 250 OP 250 PS 637: Performed by: ORTHOPAEDIC SURGERY

## 2019-10-09 PROCEDURE — 25800030 ZZH RX IP 258 OP 636: Performed by: PHYSICIAN ASSISTANT

## 2019-10-09 PROCEDURE — 71000014 ZZH RECOVERY PHASE 1 LEVEL 2 FIRST HR: Performed by: ORTHOPAEDIC SURGERY

## 2019-10-09 PROCEDURE — 25000132 ZZH RX MED GY IP 250 OP 250 PS 637: Performed by: NURSE PRACTITIONER

## 2019-10-09 PROCEDURE — G0378 HOSPITAL OBSERVATION PER HR: HCPCS

## 2019-10-09 PROCEDURE — 25000125 ZZHC RX 250: Performed by: NURSE ANESTHETIST, CERTIFIED REGISTERED

## 2019-10-09 PROCEDURE — 27210794 ZZH OR GENERAL SUPPLY STERILE: Performed by: ORTHOPAEDIC SURGERY

## 2019-10-09 PROCEDURE — 00000146 ZZHCL STATISTIC GLUCOSE BY METER IP

## 2019-10-09 PROCEDURE — 25800030 ZZH RX IP 258 OP 636: Performed by: NURSE ANESTHETIST, CERTIFIED REGISTERED

## 2019-10-09 PROCEDURE — C9290 INJ, BUPIVACAINE LIPOSOME: HCPCS | Performed by: ANESTHESIOLOGY

## 2019-10-09 PROCEDURE — 25000125 ZZHC RX 250: Performed by: PHYSICIAN ASSISTANT

## 2019-10-09 DEVICE — GRAFT BONE PUTTY DBX 05ML 038050: Type: IMPLANTABLE DEVICE | Site: KNEE | Status: FUNCTIONAL

## 2019-10-09 DEVICE — IMPLANTABLE DEVICE: Type: IMPLANTABLE DEVICE | Site: KNEE | Status: FUNCTIONAL

## 2019-10-09 DEVICE — IMP ANCHOR ARTHREX HTO IBALANCE CANCELLOUS 24MM AR-13401-24: Type: IMPLANTABLE DEVICE | Site: KNEE | Status: FUNCTIONAL

## 2019-10-09 DEVICE — GRAFT BONE CHIPS CANC 30ML 400150: Type: IMPLANTABLE DEVICE | Site: KNEE | Status: FUNCTIONAL

## 2019-10-09 DEVICE — GRAFT FEMORAL HEMI CONDYLE LEFT MEDIAL 32247002: Type: IMPLANTABLE DEVICE | Site: KNEE | Status: FUNCTIONAL

## 2019-10-09 RX ORDER — FENTANYL CITRATE 50 UG/ML
25-50 INJECTION, SOLUTION INTRAMUSCULAR; INTRAVENOUS
Status: DISCONTINUED | OUTPATIENT
Start: 2019-10-09 | End: 2019-10-09 | Stop reason: HOSPADM

## 2019-10-09 RX ORDER — OXYCODONE HYDROCHLORIDE 5 MG/1
5-10 TABLET ORAL
Status: DISCONTINUED | OUTPATIENT
Start: 2019-10-09 | End: 2019-10-10

## 2019-10-09 RX ORDER — NALOXONE HYDROCHLORIDE 0.4 MG/ML
.1-.4 INJECTION, SOLUTION INTRAMUSCULAR; INTRAVENOUS; SUBCUTANEOUS
Status: DISCONTINUED | OUTPATIENT
Start: 2019-10-09 | End: 2019-10-11 | Stop reason: HOSPADM

## 2019-10-09 RX ORDER — METHOCARBAMOL 750 MG/1
750 TABLET, FILM COATED ORAL 3 TIMES DAILY PRN
Status: DISCONTINUED | OUTPATIENT
Start: 2019-10-09 | End: 2019-10-11 | Stop reason: HOSPADM

## 2019-10-09 RX ORDER — HYDROMORPHONE HYDROCHLORIDE 1 MG/ML
0.2 INJECTION, SOLUTION INTRAMUSCULAR; INTRAVENOUS; SUBCUTANEOUS EVERY 10 MIN PRN
Status: DISCONTINUED | OUTPATIENT
Start: 2019-10-09 | End: 2019-10-09 | Stop reason: HOSPADM

## 2019-10-09 RX ORDER — ONDANSETRON 4 MG/1
4 TABLET, ORALLY DISINTEGRATING ORAL EVERY 30 MIN PRN
Status: DISCONTINUED | OUTPATIENT
Start: 2019-10-09 | End: 2019-10-09 | Stop reason: HOSPADM

## 2019-10-09 RX ORDER — BUPIVACAINE HYDROCHLORIDE 2.5 MG/ML
INJECTION, SOLUTION EPIDURAL; INFILTRATION; INTRACAUDAL PRN
Status: DISCONTINUED | OUTPATIENT
Start: 2019-10-09 | End: 2019-10-09

## 2019-10-09 RX ORDER — ACETAMINOPHEN 325 MG/1
650 TABLET ORAL EVERY 4 HOURS PRN
Status: DISCONTINUED | OUTPATIENT
Start: 2019-10-12 | End: 2019-10-11 | Stop reason: HOSPADM

## 2019-10-09 RX ORDER — LIDOCAINE HYDROCHLORIDE AND EPINEPHRINE 15; 5 MG/ML; UG/ML
INJECTION, SOLUTION EPIDURAL PRN
Status: DISCONTINUED | OUTPATIENT
Start: 2019-10-09 | End: 2019-10-09

## 2019-10-09 RX ORDER — LEVOTHYROXINE SODIUM 150 UG/1
150 TABLET ORAL DAILY
Status: DISCONTINUED | OUTPATIENT
Start: 2019-10-10 | End: 2019-10-11 | Stop reason: HOSPADM

## 2019-10-09 RX ORDER — SODIUM CHLORIDE, SODIUM LACTATE, POTASSIUM CHLORIDE, CALCIUM CHLORIDE 600; 310; 30; 20 MG/100ML; MG/100ML; MG/100ML; MG/100ML
INJECTION, SOLUTION INTRAVENOUS CONTINUOUS
Status: DISCONTINUED | OUTPATIENT
Start: 2019-10-09 | End: 2019-10-09 | Stop reason: HOSPADM

## 2019-10-09 RX ORDER — NALOXONE HYDROCHLORIDE 0.4 MG/ML
.1-.4 INJECTION, SOLUTION INTRAMUSCULAR; INTRAVENOUS; SUBCUTANEOUS
Status: DISCONTINUED | OUTPATIENT
Start: 2019-10-09 | End: 2019-10-09

## 2019-10-09 RX ORDER — SODIUM CHLORIDE, SODIUM LACTATE, POTASSIUM CHLORIDE, CALCIUM CHLORIDE 600; 310; 30; 20 MG/100ML; MG/100ML; MG/100ML; MG/100ML
INJECTION, SOLUTION INTRAVENOUS CONTINUOUS PRN
Status: DISCONTINUED | OUTPATIENT
Start: 2019-10-09 | End: 2019-10-09

## 2019-10-09 RX ORDER — HYDROMORPHONE HYDROCHLORIDE 1 MG/ML
.3-.5 INJECTION, SOLUTION INTRAMUSCULAR; INTRAVENOUS; SUBCUTANEOUS EVERY 5 MIN PRN
Status: DISCONTINUED | OUTPATIENT
Start: 2019-10-09 | End: 2019-10-09 | Stop reason: HOSPADM

## 2019-10-09 RX ORDER — ACETAMINOPHEN 325 MG/1
975 TABLET ORAL EVERY 8 HOURS
Status: DISCONTINUED | OUTPATIENT
Start: 2019-10-09 | End: 2019-10-11 | Stop reason: HOSPADM

## 2019-10-09 RX ORDER — CEFAZOLIN SODIUM 2 G/100ML
2 INJECTION, SOLUTION INTRAVENOUS
Status: COMPLETED | OUTPATIENT
Start: 2019-10-09 | End: 2019-10-09

## 2019-10-09 RX ORDER — LIDOCAINE 40 MG/G
CREAM TOPICAL
Status: DISCONTINUED | OUTPATIENT
Start: 2019-10-09 | End: 2019-10-11 | Stop reason: HOSPADM

## 2019-10-09 RX ORDER — METOCLOPRAMIDE 10 MG/1
10 TABLET ORAL EVERY 6 HOURS PRN
Status: DISCONTINUED | OUTPATIENT
Start: 2019-10-09 | End: 2019-10-11 | Stop reason: HOSPADM

## 2019-10-09 RX ORDER — CEFAZOLIN SODIUM 1 G/3ML
1 INJECTION, POWDER, FOR SOLUTION INTRAMUSCULAR; INTRAVENOUS SEE ADMIN INSTRUCTIONS
Status: DISCONTINUED | OUTPATIENT
Start: 2019-10-09 | End: 2019-10-09 | Stop reason: HOSPADM

## 2019-10-09 RX ORDER — FLUMAZENIL 0.1 MG/ML
0.2 INJECTION, SOLUTION INTRAVENOUS
Status: DISCONTINUED | OUTPATIENT
Start: 2019-10-09 | End: 2019-10-09 | Stop reason: HOSPADM

## 2019-10-09 RX ORDER — METOCLOPRAMIDE HYDROCHLORIDE 5 MG/ML
10 INJECTION INTRAMUSCULAR; INTRAVENOUS EVERY 6 HOURS PRN
Status: DISCONTINUED | OUTPATIENT
Start: 2019-10-09 | End: 2019-10-11 | Stop reason: HOSPADM

## 2019-10-09 RX ORDER — HYDROXYZINE HYDROCHLORIDE 25 MG/1
25 TABLET, FILM COATED ORAL EVERY 6 HOURS PRN
Status: DISCONTINUED | OUTPATIENT
Start: 2019-10-09 | End: 2019-10-11 | Stop reason: HOSPADM

## 2019-10-09 RX ORDER — CEFAZOLIN SODIUM 2 G/100ML
2 INJECTION, SOLUTION INTRAVENOUS EVERY 8 HOURS
Status: COMPLETED | OUTPATIENT
Start: 2019-10-10 | End: 2019-10-10

## 2019-10-09 RX ORDER — MULTIVITAMIN,THERAPEUTIC
1 TABLET ORAL DAILY
Status: DISCONTINUED | OUTPATIENT
Start: 2019-10-10 | End: 2019-10-11 | Stop reason: HOSPADM

## 2019-10-09 RX ORDER — BUPIVACAINE HYDROCHLORIDE 7.5 MG/ML
INJECTION, SOLUTION INTRASPINAL PRN
Status: DISCONTINUED | OUTPATIENT
Start: 2019-10-09 | End: 2019-10-09

## 2019-10-09 RX ORDER — AMOXICILLIN 250 MG
1 CAPSULE ORAL 2 TIMES DAILY
Status: DISCONTINUED | OUTPATIENT
Start: 2019-10-09 | End: 2019-10-11 | Stop reason: HOSPADM

## 2019-10-09 RX ORDER — LIDOCAINE HCL/EPINEPHRINE/PF 2%-1:200K
VIAL (ML) INJECTION PRN
Status: DISCONTINUED | OUTPATIENT
Start: 2019-10-09 | End: 2019-10-09

## 2019-10-09 RX ORDER — SODIUM CHLORIDE 9 MG/ML
INJECTION, SOLUTION INTRAVENOUS CONTINUOUS
Status: DISCONTINUED | OUTPATIENT
Start: 2019-10-09 | End: 2019-10-11 | Stop reason: HOSPADM

## 2019-10-09 RX ORDER — ONDANSETRON 4 MG/1
4 TABLET, ORALLY DISINTEGRATING ORAL EVERY 6 HOURS PRN
Status: DISCONTINUED | OUTPATIENT
Start: 2019-10-09 | End: 2019-10-11 | Stop reason: HOSPADM

## 2019-10-09 RX ORDER — NALOXONE HYDROCHLORIDE 0.4 MG/ML
.1-.4 INJECTION, SOLUTION INTRAMUSCULAR; INTRAVENOUS; SUBCUTANEOUS
Status: DISCONTINUED | OUTPATIENT
Start: 2019-10-09 | End: 2019-10-09 | Stop reason: HOSPADM

## 2019-10-09 RX ORDER — AMOXICILLIN 250 MG
2 CAPSULE ORAL 2 TIMES DAILY
Status: DISCONTINUED | OUTPATIENT
Start: 2019-10-09 | End: 2019-10-11 | Stop reason: HOSPADM

## 2019-10-09 RX ORDER — DIMENHYDRINATE 50 MG/ML
12.5 INJECTION, SOLUTION INTRAMUSCULAR; INTRAVENOUS
Status: DISCONTINUED | OUTPATIENT
Start: 2019-10-09 | End: 2019-10-09 | Stop reason: HOSPADM

## 2019-10-09 RX ORDER — PANTOPRAZOLE SODIUM 40 MG/1
40 TABLET, DELAYED RELEASE ORAL DAILY
Status: DISCONTINUED | OUTPATIENT
Start: 2019-10-10 | End: 2019-10-11 | Stop reason: HOSPADM

## 2019-10-09 RX ORDER — ONDANSETRON 2 MG/ML
4 INJECTION INTRAMUSCULAR; INTRAVENOUS EVERY 30 MIN PRN
Status: DISCONTINUED | OUTPATIENT
Start: 2019-10-09 | End: 2019-10-09 | Stop reason: HOSPADM

## 2019-10-09 RX ORDER — BUPIVACAINE HYDROCHLORIDE AND EPINEPHRINE 2.5; 5 MG/ML; UG/ML
INJECTION, SOLUTION INFILTRATION; PERINEURAL PRN
Status: DISCONTINUED | OUTPATIENT
Start: 2019-10-09 | End: 2019-10-09 | Stop reason: HOSPADM

## 2019-10-09 RX ORDER — CEFAZOLIN SODIUM 1 G/3ML
INJECTION, POWDER, FOR SOLUTION INTRAMUSCULAR; INTRAVENOUS PRN
Status: DISCONTINUED | OUTPATIENT
Start: 2019-10-09 | End: 2019-10-09

## 2019-10-09 RX ORDER — ASPIRIN 81 MG/1
162 TABLET ORAL DAILY
Status: DISCONTINUED | OUTPATIENT
Start: 2019-10-09 | End: 2019-10-11 | Stop reason: HOSPADM

## 2019-10-09 RX ORDER — PROCHLORPERAZINE MALEATE 5 MG
10 TABLET ORAL EVERY 6 HOURS PRN
Status: DISCONTINUED | OUTPATIENT
Start: 2019-10-09 | End: 2019-10-11 | Stop reason: HOSPADM

## 2019-10-09 RX ORDER — ONDANSETRON 2 MG/ML
4 INJECTION INTRAMUSCULAR; INTRAVENOUS EVERY 6 HOURS PRN
Status: DISCONTINUED | OUTPATIENT
Start: 2019-10-09 | End: 2019-10-11 | Stop reason: HOSPADM

## 2019-10-09 RX ORDER — LISDEXAMFETAMINE DIMESYLATE 50 MG/1
50 CAPSULE ORAL DAILY
Status: DISCONTINUED | OUTPATIENT
Start: 2019-10-10 | End: 2019-10-11 | Stop reason: HOSPADM

## 2019-10-09 RX ADMIN — HYDROXYZINE HYDROCHLORIDE 25 MG: 25 TABLET ORAL at 22:13

## 2019-10-09 RX ADMIN — SODIUM CHLORIDE 1 G: 9 INJECTION, SOLUTION INTRAVENOUS at 15:50

## 2019-10-09 RX ADMIN — CEFAZOLIN 1 G: 1 INJECTION, POWDER, FOR SOLUTION INTRAMUSCULAR; INTRAVENOUS at 17:42

## 2019-10-09 RX ADMIN — SODIUM CHLORIDE, POTASSIUM CHLORIDE, SODIUM LACTATE AND CALCIUM CHLORIDE: 600; 310; 30; 20 INJECTION, SOLUTION INTRAVENOUS at 15:42

## 2019-10-09 RX ADMIN — ACETAMINOPHEN 975 MG: 325 TABLET, FILM COATED ORAL at 21:41

## 2019-10-09 RX ADMIN — BUPIVACAINE HYDROCHLORIDE 20 ML: 2.5 INJECTION, SOLUTION EPIDURAL; INFILTRATION; INTRACAUDAL at 13:45

## 2019-10-09 RX ADMIN — CEFAZOLIN SODIUM 2 G: 2 INJECTION, SOLUTION INTRAVENOUS at 15:42

## 2019-10-09 RX ADMIN — LIDOCAINE HYDROCHLORIDE,EPINEPHRINE BITARTRATE 3 ML: 15; .005 INJECTION, SOLUTION EPIDURAL; INFILTRATION; INTRACAUDAL; PERINEURAL at 17:52

## 2019-10-09 RX ADMIN — BUPIVACAINE 10 ML: 13.3 INJECTION, SUSPENSION, LIPOSOMAL INFILTRATION at 13:45

## 2019-10-09 RX ADMIN — LIDOCAINE HYDROCHLORIDE,EPINEPHRINE BITARTRATE 5 ML: 20; .005 INJECTION, SOLUTION EPIDURAL; INFILTRATION; INTRACAUDAL; PERINEURAL at 17:58

## 2019-10-09 RX ADMIN — METHOCARBAMOL 750 MG: 750 TABLET, FILM COATED ORAL at 22:12

## 2019-10-09 RX ADMIN — BUPIVACAINE HYDROCHLORIDE IN DEXTROSE 1.8 ML: 7.5 INJECTION, SOLUTION SUBARACHNOID at 15:40

## 2019-10-09 RX ADMIN — SODIUM CHLORIDE: 9 INJECTION, SOLUTION INTRAVENOUS at 21:43

## 2019-10-09 RX ADMIN — ASPIRIN 162 MG: 81 TABLET ORAL at 21:41

## 2019-10-09 ASSESSMENT — MIFFLIN-ST. JEOR: SCORE: 1722.88

## 2019-10-09 NOTE — LETTER
Health Information Management Services               Recipient:  [facility]        Sender:  Maria Elena You RN, BSN  Care Coordinator, 8A  Phone [phone]  Pager [phone]          Date: October 10, 2019  Patient Name:  Sinai Shelley  Routing Message:      Demographics      The documents accompanying this notice contain confidential information belonging to the sender.  This information is intended only for the use of the individual or entity named above.  The authorized recipient of this information is prohibited from disclosing this information to any other party and is required to destroy the information after its stated need has been fulfilled, unless otherwise required by state law.      If you are not the intended recipient, you are hereby notified that any disclosure, copy, distribution or action taken in reliance on the contents of these documents is strictly prohibited.  If you have received this document in error, please return it by fax to [phone] with a note on the cover sheet explaining why you are returning it (e.g. not your patient, not your provider, etc.).  If you need further assistance, please call [employer] Centralized Transcription at [phone].  Documents may also be returned by mail to [employer], , [facility] [address], [place], [place] [address].

## 2019-10-09 NOTE — BRIEF OP NOTE
[facility], [place]    Brief Operative Note    Pre-operative diagnosis: Chondral lesion [M94.9]  Post-operative diagnosis same  Procedure: Procedure(s):  Left Knee Arthroscopy  Medial Femoral Condyle, Osteochondral With Allograft  Left Knee Proximal Tibial Osteotomy  Surgeon: Surgeon(s) and Role:     * Chaparro Brooke MD - Primary     * Gustavo Collado PA-C - Assisting     * Blake Menon MD - Resident - Assisting  Anesthesia: Other   Estimated blood loss: 250cc  Drains: None  Specimens: * No specimens in log *  Findings:   see dictated report.  Complications: None.  Implants:    Implant Name Type Inv. Item Serial No.  Lot No. LRB No. Used   GRAFT FEMORAL CAMPBELL CONDYLE LEFT MEDIAL [id number] Bone/Tissue/Biologic GRAFT FEMORAL CAMPBELL CONDYLE LEFT MEDIAL [id number] [id number] Left 1   GRAFT BONE PUTTY DBX 05ML [id number] Bone/Tissue/Biologic GRAFT BONE PUTTY DBX 05ML [id number] MUSCULOSKELETAL PATRICIA  Left 1   GRAFT BONE CHIPS CANC 30ML [id number] Bone/Tissue/Biologic GRAFT BONE CHIPS CANC 30ML [id number] MUSCULOSKELETAL PATRICIA  Left 1   IMP ANCHOR ARTHREX HTO IBALANCE CANCELLOUS 24MM [id number] Metallic Hardware/Hiko IMP ANCHOR ARTHREX HTO IBALANCE CANCELLOUS 24MM [id number]  ARTHREX [id number] Left 1   IMP ANCHOR ARTHREX HTO IBALANCE CANCELLOUS 28MM [id number] Metallic Hardware/Hiko IMP ANCHOR ARTHREX HTO IBALANCE CANCELLOUS 28MM [id number]  ARTHREX [id number] Left 1   IMP ANCHOR ARTHREX HTO IBALANCE CORTICAL 40MM [id number] Metallic Hardware/Hiko IMP ANCHOR ARTHREX HTO IBALANCE CORTICAL 40MM [id number]  ARTHREX [id number] Left 1   IMP TIBIAL ARTHREX HTO IBALANCE SM 7D/MED 6D [id number] Metallic Hardware/Hiko IMP TIBIAL ARTHREX HTO IBALANCE SM 7D/MED 6D [id number]  ARTHREX [id number] Left 1   IMP ANCHOR ARTHREX HTO IBALANCE CORTICAL 34MM [id number] Metallic Hardware/Hiko IMP ANCHOR ARTHREX HTO IBALANCE CORTICAL  34MM [id number]  ARTHREX [id number] Left 1          Orthopedics Primary  Activity: Up with assist until independent.  Weight bearing status: TTWB with HKB locked in extension  Pain management: Transition from IV to PO as tolerated.   Antibiotics: Ancef x 24hrs   Diet: Begin with clear fluids and progress diet as tolerated.   DVT prophylaxis: SCDs, ASA 162mg  Imaging: None  Labs: Hgb POD#1  Bracing/Splinting: Keep HKB locked in extension.  Dressings: Keep c/d/i.  Change prior to discharge.  No submerging.  Shower POD5  Drains: None  Physical Therapy/Occupational Therapy: Eval and treat.  crutch training and ADLs.  CPM 0-30, advance as tolerated, 6-8hrs daily.  Consults: PT/OT  Follow-up: Clinic in 2 weeks for wound check, suture removal and xrays  Disposition: Pending progress with PT , pain control on orals. anticipate discharge to home on POD #1-2.    Blake Menon MD  Orthopedic Surgery PGY4  [phone]

## 2019-10-09 NOTE — LETTER
Health Information Management Services               Recipient:    [employer]  Attn: Intake        Sender:  Maria Elena You RN, BSN  Care Coordinator, 8A  Phone [phone]  Pager [phone]          Date: October 10, 2019  Patient Name:  Sinai Shelley  Routing Message:      Preliminary home care orders. Patient to discharge to home on 10/11/2019. Signed orders to be faxed prior to discharge.      The documents accompanying this notice contain confidential information belonging to the sender.  This information is intended only for the use of the individual or entity named above.  The authorized recipient of this information is prohibited from disclosing this information to any other party and is required to destroy the information after its stated need has been fulfilled, unless otherwise required by state law.      If you are not the intended recipient, you are hereby notified that any disclosure, copy, distribution or action taken in reliance on the contents of these documents is strictly prohibited.  If you have received this document in error, please return it by fax to [phone] with a note on the cover sheet explaining why you are returning it (e.g. not your patient, not your provider, etc.).  If you need further assistance, please call [email or web address] Centralized Transcription at [phone].  Documents may also be returned by mail to [employer], , [facility]note [facility] [address], [place], [place] [address].

## 2019-10-09 NOTE — LETTER
Health Information Management Services               Recipient:      [facility]    Sender:    Maria Elena You RN, BSN  Care Coordinator, 8A  Phone [phone]  Pager [phone]          Date: October 11, 2019  Patient Name:  Sinai Shelley  Routing Message:      Occupational Therapy added to original home care orders.      The documents accompanying this notice contain confidential information belonging to the sender.  This information is intended only for the use of the individual or entity named above.  The authorized recipient of this information is prohibited from disclosing this information to any other party and is required to destroy the information after its stated need has been fulfilled, unless otherwise required by state law.      If you are not the intended recipient, you are hereby notified that any disclosure, copy, distribution or action taken in reliance on the contents of these documents is strictly prohibited.  If you have received this document in error, please return it by fax to [phone] with a note on the cover sheet explaining why you are returning it (e.g. not your patient, not your provider, etc.).  If you need further assistance, please call [employer] Centralized Transcription at [phone].  Documents may also be returned by mail to [employer], , [facility] [address] [place], [place] [address].

## 2019-10-09 NOTE — ANESTHESIA PROCEDURE NOTES
Combined Spinal/Epidural procedure note    Staff  Anesthesiologist:  Ángel Reyes MD  Location:  OR  Timeout  patient identified, IV checked, site marked, risks and benefits discussed, informed consent, monitors and equipment checked, pre-op evaluation, at physician/surgeon's request and post-op pain management    Correct Patient: Yes    Correct Position: Yes    Correct Site: Yes    Correct Procedure: Yes    Correct Laterality:  Yes  Site Marked:  Yes    Procedure details  Procedure:  Combined Spinal/Epidural (CSE)  Position:  Sitting  ASA:  2  Sterile Prep: chloraprep    Insertion site:  L3-4  Local skin infiltration:  2% lidocaine  amount (mL):  2  Approach:  Midline  Epidural Needle Type:  NegroTraka  Needle gauge (G):  17  Needle length (in):  3.5  Injection Technique:  LORT saline  DAVID at (cm):  7  Attempts:  2  Redirects:  2  Spinal Needle (G):  25  Spinal Needle Type:  Nadir  Spinal Needle Length (in):  4 11/16  Catheter gauge (G):  19  Catheter threaded easily: Yes    Threaded to skin (cm) :  11  Threaded in epidural space (cm):  4  Paresthesias:  No  CSF with Epidural needle: No    CSF with Spinal needle: Yes    Aspiration negative for Heme or CSF: Yes

## 2019-10-09 NOTE — ANESTHESIA PREPROCEDURE EVALUATION
Anesthesia Pre-Procedure Evaluation    Patient: Sinai Shelley   MRN:     [id number] Gender:   female   Age:    44 year old :      1975        Preoperative Diagnosis: Chondral lesion [M94.9]   Procedure(s):  Left Knee Arthroscopy  Medial Femoral Condyle, Osteochondral With Allograft  Left Knee Proximal Tibial Osteotomy     Past Medical History:   Diagnosis Date     Contact dermatitis and other eczema, due to unspecified cause      Ganglion, unspecified      Migraine, unspecified, without mention of intractable migraine without mention of status migrainosus      Nausea alone      Other specified disorders of biliary tract      Pain in joint, site unspecified      PONV (postoperative nausea and vomiting)     slow to awaken from anesthesia     Streptococcal sore throat      Unspecified gastritis and gastroduodenitis without mention of hemorrhage      Unspecified vitamin D deficiency       Past Surgical History:   Procedure Laterality Date     APPENDECTOMY       LAPAROSCOPIC CHOLECYSTECTOMY  2013    Procedure: LAPAROSCOPIC CHOLECYSTECTOMY;  LAPAROSCOPIC CHOLECYSTECTOMY;  Surgeon: Chaparro Benton MD;  Location: [facility]     ORTHOPEDIC SURGERY      knee arthroscopy, arthrocentesis aspiration of ganglion cyst of left wrist     THYROIDECTOMY N/A 2015    Procedure: THYROIDECTOMY;  Surgeon: Rosenda Coughlin MD;  Location: [facility] OR          Anesthesia Evaluation     . Pt has had prior anesthetic. Type: General and Regional    History of anesthetic complications   - PONV        ROS/MED HX    ENT/Pulmonary:     (+)LAILA risk factors obese, vocal cord abnormalities- Horseness, , . Other pulmonary disease .   (-) cystic fibrosis (Multiple pulmonary nodules)   Neurologic:  - neg neurologic ROS     Cardiovascular:  - neg cardiovascular ROS       METS/Exercise Tolerance:  >4 METS   Hematologic:  - neg hematologic  ROS       Musculoskeletal: Comment: DJD        GI/Hepatic: Comment: Hx/o gastroduodenitis         Renal/Genitourinary:  - ROS Renal section negative       Endo:     (+) thyroid problem hypothyroidism, Obesity, .      Psychiatric:         Infectious Disease:  - neg infectious disease ROS       Malignancy:   (+) Malignancy History of Other  Other CA Thyroid Ca Remission status post Surgery         Other:    (+) No chance of pregnancy                        PHYSICAL EXAM:   Mental Status/Neuro: A/A/O   Airway: Facies: Feasible  Mallampati: I  Mouth/Opening: Full  TM distance: > 6 cm  Neck ROM: Full   Respiratory: Auscultation: CTAB     Resp. Rate: Normal     Resp. Effort: Normal      CV: Rhythm: Regular  Rate: Age appropriate  Heart: Normal Sounds  Edema: None   Comments:      Dental: Normal Dentition                LABS:  CBC:   Lab Results   Component Value Date    WBC 7.3 08/18/2015    WBC 8.2 05/22/2015    HGB 13.4 08/18/2015    HGB 14.0 05/22/2015    HCT 40.2 08/18/2015    HCT 42.4 05/22/2015     08/18/2015     05/22/2015     BMP:   Lab Results   Component Value Date     12/20/2014     09/09/2013    POTASSIUM 4.2 12/20/2014    POTASSIUM 3.0 (L) 09/09/2013    CHLORIDE 110 (H) 12/20/2014    CHLORIDE 102 09/09/2013    CO2 23 12/20/2014    CO2 25 09/09/2013    BUN 11 12/20/2014    BUN 8 09/09/2013    CR 0.55 08/16/2016    CR 0.63 12/20/2014    GLC 75 12/20/2014    GLC 84 09/09/2013     COAGS: No results found for: PTT, INR, FIBR  POC:   Lab Results   Component Value Date    BGM 98 10/09/2019    HCG Negative 10/09/2019     OTHER:   Lab Results   Component Value Date    UVALDO 9.2 08/16/2016    ALBUMIN 2.1 (L) 12/20/2014    PROTTOTAL 5.8 (L) 12/20/2014    ALT 13 12/20/2014    AST 14 12/20/2014    ALKPHOS 256 (H) 12/20/2014    BILITOTAL 0.3 12/20/2014    LIPASE 192 09/09/2013    TSH <0.01 (L) 03/05/2018    T4 1.41 03/05/2018        Preop Vitals    BP Readings from Last 3 Encounters:   10/09/19 (!) 148/89   03/05/18 130/78   09/12/17 131/83    Pulse Readings from Last 3 Encounters:  "  10/09/19 102   03/05/18 80   09/12/17 121      Resp Readings from Last 3 Encounters:   10/09/19 18   05/05/15 18   12/21/14 16    SpO2 Readings from Last 3 Encounters:   10/09/19 98%   05/05/15 97%   12/20/14 (!) 89%      Temp Readings from Last 1 Encounters:   10/09/19 36.4  C (97.5  F) (Oral)    Ht Readings from Last 1 Encounters:   10/09/19 1.651 m (5' 5\")      Wt Readings from Last 1 Encounters:   10/09/19 107.2 kg (236 lb 5.3 oz)    Estimated body mass index is 39.33 kg/m  as calculated from the following:    Height as of this encounter: 1.651 m (5' 5\").    Weight as of this encounter: 107.2 kg (236 lb 5.3 oz).     LDA:  Peripheral IV 10/09/19 Right;Dorsal Hand (Active)   Site Assessment WDL 10/9/2019 12:51 PM   Line Status Saline locked 10/9/2019 12:51 PM   Phlebitis Scale 0-->no symptoms 10/9/2019 12:51 PM   Dressing Intervention New dressing  10/9/2019 12:51 PM   Number of days: 0        Assessment:   ASA SCORE: 2    H&P: History and physical reviewed and following examination; no interval change.   Smoking Status:  Non-Smoker/Unknown   NPO Status: NPO Appropriate     Plan:   Anes. Type:  Spinal; Peripheral Nerve Block     Block Details: Exparel; Femoral   Pre-Medication: None   Induction:  IV (Standard)      Access/Monitoring: PIV   Maintenance: N/a     Postop Plan:     Postop Sedation/Airway: Not planned     PONV Management:   Adult Risk Factors: Female, H/o PONV or Motion Sickness, Non-Smoker   Prevention: Ondansetron, Dexamethasone     CONSENT: Direct conversation   Plan and risks discussed with: Patient   Blood Products: Consent Deferred (Minimal Blood Loss)       Comments for Plan/Consent:  Intrathecal anesthesia, patient desires to avoid sedation, GA as back up  PNB per RAPS  Standard ASA monitors  All pertinent notes and results reviewed.  Risks, benefits discussed including HA, back pain, failed block, airway injury, hypoxemia, laryngo/bronchospasm, PONV as common complications with GA, " questions/concerns answered.                 Bassem Moore MD

## 2019-10-09 NOTE — ANESTHESIA PROCEDURE NOTES
Peripheral Nerve Block Procedure Note    Staff:     Anesthesiologist:  Ovidio Bhakta DO    Resident/CRNA:  Blake John MD    Block performed by resident/CRNA in the presence of a teaching physician    Location: Pre-op  Procedure Start/Stop TImes:     patient identified, IV checked, site marked, risks and benefits discussed, informed consent, monitors and equipment checked, pre-op evaluation, at physician/surgeon's request and post-op pain management      Correct Patient: Yes      Correct Position: Yes      Correct Site: Yes      Correct Procedure: Yes      Correct Laterality:  Yes    Site Marked:  Yes  Procedure details:     Procedure:  Femoral    Laterality:  Left    Position:  Supine    Sterile Prep: chloraprep, mask and sterile gloves      Needle:  Short bevel    Needle gauge:  21    Needle length (mm):  100    Ultrasound: Yes      Ultrasound used to identify targeted nerve, plexus, or vascular structure and placed a needle adjacent to it      Permanent Image entered into patiient's record      Abnormal pain on injection: No      Blood Aspirated: No      Paresthesias:  No    Bleeding at site: No      Test dose negative for signs of intravascular injection: Yes      Bolus via:  Needle    Infusion Method:  Single Shot    Blood aspirated via catheter: No      Complications:  None  Assessment/Narrative:     Injection made incrementally with aspirations every (mL):  5

## 2019-10-10 ENCOUNTER — APPOINTMENT (OUTPATIENT)
Dept: PHYSICAL THERAPY | Facility: CLINIC | Age: 44
DRG: 489 | End: 2019-10-10
Attending: PHYSICIAN ASSISTANT
Payer: COMMERCIAL

## 2019-10-10 LAB — HGB BLD-MCNC: 12 G/DL (ref 11.7–15.7)

## 2019-10-10 PROCEDURE — 97161 PT EVAL LOW COMPLEX 20 MIN: CPT | Mod: GP | Performed by: PHYSICAL THERAPIST

## 2019-10-10 PROCEDURE — 36415 COLL VENOUS BLD VENIPUNCTURE: CPT | Performed by: PHYSICIAN ASSISTANT

## 2019-10-10 PROCEDURE — 25000132 ZZH RX MED GY IP 250 OP 250 PS 637: Performed by: STUDENT IN AN ORGANIZED HEALTH CARE EDUCATION/TRAINING PROGRAM

## 2019-10-10 PROCEDURE — 97116 GAIT TRAINING THERAPY: CPT | Mod: GP | Performed by: PHYSICAL THERAPIST

## 2019-10-10 PROCEDURE — 25000132 ZZH RX MED GY IP 250 OP 250 PS 637: Performed by: NURSE PRACTITIONER

## 2019-10-10 PROCEDURE — 25000132 ZZH RX MED GY IP 250 OP 250 PS 637: Performed by: PHYSICIAN ASSISTANT

## 2019-10-10 PROCEDURE — 97530 THERAPEUTIC ACTIVITIES: CPT | Mod: GP | Performed by: PHYSICAL THERAPIST

## 2019-10-10 PROCEDURE — 85018 HEMOGLOBIN: CPT | Performed by: PHYSICIAN ASSISTANT

## 2019-10-10 PROCEDURE — 12000001 ZZH R&B MED SURG/OB UMMC

## 2019-10-10 PROCEDURE — 25000128 H RX IP 250 OP 636: Performed by: NURSE PRACTITIONER

## 2019-10-10 PROCEDURE — G0378 HOSPITAL OBSERVATION PER HR: HCPCS

## 2019-10-10 PROCEDURE — 25000128 H RX IP 250 OP 636: Performed by: PHYSICIAN ASSISTANT

## 2019-10-10 RX ORDER — HYDROMORPHONE HYDROCHLORIDE 2 MG/1
2-4 TABLET ORAL
Status: DISCONTINUED | OUTPATIENT
Start: 2019-10-10 | End: 2019-10-11 | Stop reason: HOSPADM

## 2019-10-10 RX ORDER — IBUPROFEN 600 MG/1
600 TABLET, FILM COATED ORAL EVERY 6 HOURS PRN
Status: DISCONTINUED | OUTPATIENT
Start: 2019-10-10 | End: 2019-10-11 | Stop reason: HOSPADM

## 2019-10-10 RX ORDER — IBUPROFEN 200 MG
200 TABLET ORAL EVERY 4 HOURS PRN
Status: DISCONTINUED | OUTPATIENT
Start: 2019-10-10 | End: 2019-10-10

## 2019-10-10 RX ORDER — KETOROLAC TROMETHAMINE 15 MG/ML
15 INJECTION, SOLUTION INTRAMUSCULAR; INTRAVENOUS EVERY 6 HOURS
Status: DISCONTINUED | OUTPATIENT
Start: 2019-10-10 | End: 2019-10-10

## 2019-10-10 RX ORDER — ACETAMINOPHEN 325 MG/1
TABLET ORAL
Qty: 1 BOTTLE | Refills: 0 | Status: SHIPPED | OUTPATIENT
Start: 2019-10-10 | End: 2021-07-16

## 2019-10-10 RX ORDER — TRAMADOL HYDROCHLORIDE 50 MG/1
50 TABLET ORAL EVERY 4 HOURS PRN
Status: DISCONTINUED | OUTPATIENT
Start: 2019-10-10 | End: 2019-10-11 | Stop reason: HOSPADM

## 2019-10-10 RX ORDER — HYDROXYZINE HYDROCHLORIDE 25 MG/1
25 TABLET, FILM COATED ORAL EVERY 6 HOURS PRN
Qty: 20 TABLET | Refills: 0 | Status: SHIPPED | OUTPATIENT
Start: 2019-10-10 | End: 2021-07-16

## 2019-10-10 RX ADMIN — TRAMADOL HYDROCHLORIDE 50 MG: 50 TABLET, FILM COATED ORAL at 17:47

## 2019-10-10 RX ADMIN — HYDROXYZINE HYDROCHLORIDE 25 MG: 25 TABLET ORAL at 23:10

## 2019-10-10 RX ADMIN — SENNOSIDES AND DOCUSATE SODIUM 2 TABLET: 8.6; 5 TABLET ORAL at 08:52

## 2019-10-10 RX ADMIN — ASPIRIN 162 MG: 81 TABLET ORAL at 08:52

## 2019-10-10 RX ADMIN — TRAMADOL HYDROCHLORIDE 50 MG: 50 TABLET, FILM COATED ORAL at 23:10

## 2019-10-10 RX ADMIN — IBUPROFEN 200 MG: 200 TABLET, FILM COATED ORAL at 01:23

## 2019-10-10 RX ADMIN — PANTOPRAZOLE SODIUM 40 MG: 40 TABLET, DELAYED RELEASE ORAL at 08:52

## 2019-10-10 RX ADMIN — KETOROLAC TROMETHAMINE 15 MG: 15 INJECTION, SOLUTION INTRAMUSCULAR; INTRAVENOUS at 06:50

## 2019-10-10 RX ADMIN — MULTIPLE VITAMINS W/ MINERALS TAB 1 TABLET: TAB at 08:52

## 2019-10-10 RX ADMIN — LEVOTHYROXINE SODIUM 150 MCG: 150 TABLET ORAL at 09:00

## 2019-10-10 RX ADMIN — CEFAZOLIN SODIUM 2 G: 2 INJECTION, SOLUTION INTRAVENOUS at 08:52

## 2019-10-10 RX ADMIN — METHOCARBAMOL 750 MG: 750 TABLET, FILM COATED ORAL at 21:19

## 2019-10-10 RX ADMIN — ACETAMINOPHEN 975 MG: 325 TABLET, FILM COATED ORAL at 12:38

## 2019-10-10 RX ADMIN — ACETAMINOPHEN 975 MG: 325 TABLET, FILM COATED ORAL at 05:34

## 2019-10-10 RX ADMIN — CEFAZOLIN SODIUM 2 G: 2 INJECTION, SOLUTION INTRAVENOUS at 01:24

## 2019-10-10 RX ADMIN — IBUPROFEN 600 MG: 600 TABLET, FILM COATED ORAL at 21:19

## 2019-10-10 RX ADMIN — KETOROLAC TROMETHAMINE 15 MG: 15 INJECTION, SOLUTION INTRAMUSCULAR; INTRAVENOUS at 12:38

## 2019-10-10 RX ADMIN — LISDEXAMFETAMINE DIMESYLATE 50 MG: 50 CAPSULE ORAL at 09:00

## 2019-10-10 ASSESSMENT — ACTIVITIES OF DAILY LIVING (ADL)
ADLS_ACUITY_SCORE: 15
ADLS_ACUITY_SCORE: 13
ADLS_ACUITY_SCORE: 15
ADLS_ACUITY_SCORE: 15
ADLS_ACUITY_SCORE: 13
ADLS_ACUITY_SCORE: 13

## 2019-10-10 NOTE — PROGRESS NOTES
Patient arrived on unit at 2100. Alert and oriented X 4, able to make needs known. Complaining of left knee/leg pain. Declines to take opioids due to nausea and vomiting. Medicated with Tylenol, robaxin, and atarax with some relief. Left leg ace wrapped with brace in place. Last BM today. Some urinary incontinence, last PVR 41 ml. Drinking okay, no nausea. Continue the plan of care.

## 2019-10-10 NOTE — PLAN OF CARE
VS: VSS.  A & O x 4.   O2: O2 sats >95% on RA.  Lung sounds clear. Denies SOB.  Pulse ox on.   Output: Voiding adequate amounts.   Pt was incontinent of urine x2. Voided after incontinence x1. PVR 29.   Last BM: LBM 10/9/19 before surgery.   Activity: Up w/ A1 FWW and gait belt.  TTWB.   Skin: Skin intact except for surgical incision.   Pain: Pt reported pain to incision. Pain managed w/ scheduled tylenol, prn ibuprofen, and ice applied.   Ortho updated of pt having increased pain. Prn IV Toradol ordered by ortho and given.    CMS: Pt reports numbness to LLE.    Dressing: CDI, no drainage noted.   Diet: Regular diet. Denies nausea.   LDA: R PIV infusing NS 0.9% at 75 mL/hr.  Pt still had epidural line in from surgery. Ortho updated, and ortho updated anesthesia.    Equipment: Hinged knee brace.  FWW and gait belt.  IV pump and pole.  To start CPM this AM.   Plan: TBD.   Additional Info: Call light within reach, able to make needs known.

## 2019-10-10 NOTE — ANESTHESIA CARE TRANSFER NOTE
Patient: Sinai Shelley    Procedure(s):  Left Knee Arthroscopy  Medial Femoral Condyle, Osteochondral With Allograft  Left Knee Proximal Tibial Osteotomy    Diagnosis: Chondral lesion [M94.9]  Diagnosis Additional Information: No value filed.    Anesthesia Type:   Spinal, Peripheral Nerve Block     Note:  Airway :Room Air  Patient transferred to:PACU  Comments: Arrived in PACU, report to RN, vitals stable, patient comfortable.  Handoff Report: Identifed the Patient, Identified the Reponsible Provider, Reviewed the pertinent medical history, Discussed the surgical course, Reviewed Intra-OP anesthesia mangement and issues during anesthesia, Set expectations for post-procedure period and Allowed opportunity for questions and acknowledgement of understanding      Vitals: (Last set prior to Anesthesia Care Transfer)    CRNA VITALS  10/9/2019 1837 - 10/9/2019 1916      10/9/2019             NIBP:  128/85    Ht Rate:  84                Electronically Signed By: MARY Hatch CRNA  October 9, 2019  7:16 PM

## 2019-10-10 NOTE — PLAN OF CARE
Patient A&O x4, lungs sound clear, Bowel sound active, Denied CP, lightheadedness, dizziness, numbness, tingling and SOB, iv saline locked, drinking well and voiding spontaneously without difficulties, able to wiggle toes, dressing clean,dry and intact, hinged brace on and unlocked during CPM use, , CMS intact, pain tolerable and taking Toradol and Tylenol for pain, ice pack applied to left knee,  at bedside,  incentive spirometer encouraged and done several times, up with assist of one using walker, heels elevated off bed, demonstrates the ability to use call light appropriately, will continue to monitor patient.

## 2019-10-10 NOTE — PROGRESS NOTES
10/10/19 1300   Quick Adds   Type of Visit Initial PT Evaluation   Living Environment   Lives With spouse   Living Arrangements house   Home Accessibility stairs to enter home;stairs within home   Number of Stairs, Main Entrance 3   Stair Railings, Main Entrance none   Number of Stairs, Within Home, Primary 10   Stair Railings, Within Home, Primary railing on right side (ascending)   Living Environment Comment walk in shower, no grab bars, has adjustable bed, very high uses steps   Self-Care   Usual Activity Tolerance good   Current Activity Tolerance fair   Equipment Currently Used at Home none   Activity/Exercise/Self-Care Comment pt reports active lifestyle with kids   Functional Level Prior   Ambulation 0-->independent   Transferring 0-->independent   Toileting 0-->independent   Bathing 0-->independent   Communication 0-->understands/communicates without difficulty   Swallowing 0-->swallows foods/liquids without difficulty   Cognition 0 - no cognition issues reported   Fall history within last six months no   Which of the above functional risks had a recent onset or change? none   General Information   Onset of Illness/Injury or Date of Surgery - Date 10/09/19   Referring Physician Edwardo   Patient/Family Goals Statement pt plans to discharge to home with spouse   Pertinent History of Current Problem (include personal factors and/or comorbidities that impact the POC) Left knee diagnostic arthroscopy, open medial femoral condyle osteochondral grafting with allograft, left proximal tibial osteotomy on 10/9 with Dr. Brooke.   Precautions/Limitations fall precautions   Weight-Bearing Status - LUE full weight-bearing   Weight-Bearing Status - RUE full weight-bearing   Weight-Bearing Status - LLE toe touch weight-bearing;full weight-bearing   Weight-Bearing Status - RLE full weight-bearing   General Info Comments HKB on locked in extension   Cognitive Status Examination   Orientation orientation to person, place  "and time   Level of Consciousness alert   Follows Commands and Answers Questions 100% of the time   Personal Safety and Judgment intact   Memory intact   Pain Assessment   Patient Currently in Pain Yes, see Vital Sign flowsheet  (4/10)   Integumentary/Edema   Integumentary/Edema Comments L knee per surgery   Range of Motion (ROM)   ROM Comment L knee limited, in brace locked in full extension   Strength   Strength Comments good strength, able to lift L LE antigravity   Bed Mobility   Bed Mobility Comments bed mob with SBA and A for set up   Transfer Skills   Transfer Comments sit to stand with SBA and FWW, TTWB   Gait   Gait Comments amb with TTWB with FWW,    Balance   Balance Comments Impaired standing balance with TTWB requires B UE support and A to maintain with dynamic balance   General Therapy Interventions   Planned Therapy Interventions gait training   Clinical Impression   Criteria for Skilled Therapeutic Intervention yes, treatment indicated   PT Diagnosis difficulty walking   Influenced by the following impairments post op pain, decreased ROM and TTWB   Functional limitations due to impairments impaired functional mob I and safety   Clinical Presentation Stable/Uncomplicated   Clinical Presentation Rationale clinical judgement   Clinical Decision Making (Complexity) Low complexity   Therapy Frequency 2x/day   Predicted Duration of Therapy Intervention (days/wks) 2 days   Anticipated Discharge Disposition Home with Assist;Home with Home Therapy   Risk & Benefits of therapy have been explained Yes   Patient, Family & other staff in agreement with plan of care Yes   [facility] [employer] TM \"6 Clicks\"   2016, Trustees of [facility], under license to [employer].  All rights reserved.   6 Clicks Short Forms Basic Mobility Inpatient Short Form   [facility] AM-PAC  \"6 Clicks\" V.2 Basic Mobility Inpatient Short Form   1. Turning from your back to your side while in a flat bed without using " bedrails? 4 - None   2. Moving from lying on your back to sitting on the side of a flat bed without using bedrails? 4 - None   3. Moving to and from a bed to a chair (including a wheelchair)? 4 - None   4. Standing up from a chair using your arms (e.g., wheelchair, or bedside chair)? 4 - None   5. To walk in hospital room? 3 - A Little   6. Climbing 3-5 steps with a railing? 3 - A Little   Basic Mobility Raw Score (Score out of 24.Lower scores equate to lower levels of function) 22   Total Evaluation Time   Total Evaluation Time (Minutes) 10

## 2019-10-10 NOTE — PROGRESS NOTES
Patient evaluated this morning and epidural catheter removed with tip intact. Insertion site clean and intact. Patient reports that pain is tolerable. Reports that Toradol has helped improve her pain.     Blake John MD   Regional Anesthesia and Acute Pain

## 2019-10-10 NOTE — PROGRESS NOTES
"Orthopedic Surgery Progress Note    Subjective: No acute events overnight. Spinal has worn off overnight, with increased pain. Patient wishes to avoid narcotics if possible. Tolerating PO diet. Voiding spontaneously.  Denies cp, sob, calf pain, fevers, chills, sweats. Denies new onset numbness/tingling in operative extremity.    Exam:  /67 (BP Location: Left arm)   Pulse 74   Temp 97.4  F (36.3  C) (Oral)   Resp 18   Ht 1.651 m (5' 5\")   Wt 107.2 kg (236 lb 5.3 oz)   LMP 09/07/2019   SpO2 99%   BMI 39.33 kg/m    Gen: Awake, alert, NAD  Resp: breathing equal and non-labored  Extremities:    LLE: operative dressing c/d/i. Hinged knee brace in place. 5/5 EHL/FHL/TA/Gsc. SILT in s/s/dp/sp/t distributions. 2+ DP and PT pulses. Toes WWP      Labs:  No lab results found in last 7 days.  No lab results found in last 7 days.  No lab results found in last 7 days.    Assessment:   44 year old female who is now s/p:    Procedure:  Left knee diagnostic arthroscopy, open medial femoral condyle osteochondral grafting with allograft, left proximal tibial osteotomy on 10/9 with Dr. Brooke.    Recovering appropriately.    Orthopedics Primary  Activity: Up with assist until independent.  Weight bearing status: TTWB with HKB locked in extension  Pain management: patient wishes to avoid narcotics. PO with IV toradol for breakthrough.  Antibiotics: Ancef x 24hrs   Diet: Begin with clear fluids and progress diet as tolerated.   DVT prophylaxis: SCDs, ASA 162mg  Imaging: None  Labs: Hgb POD#1  Bracing/Splinting: Keep HKB locked in extension.  Dressings: Keep c/d/i.  Change prior to discharge.  No submerging.  Shower POD5  Drains: None  Physical Therapy/Occupational Therapy: Eval and treat.  crutch training and ADLs.  CPM 0-30, advance as tolerated, 6-8hrs daily.  Consults: PT/OT  Follow-up: Clinic in 2 weeks for wound check, suture removal and xrays    Disposition: Pending progress with PT , pain control on orals. anticipate " discharge to home on POD #1-2.    Future Appointments   Date Time Provider Department Center   10/10/2019  2:00 PM Mayra Verdugo, PT [facility] [place]   10/10/2019  7:00 PM UR OT OVERFLOW [facility] [place]   10/21/2019  2:00 PM Karime Horn MD [facility]        Blake Menon, PGY4  Orthopedic Surgery Resident  Pager [phone]

## 2019-10-10 NOTE — PLAN OF CARE
Discharge Planner PT   Patient plan for discharge: home with spouse  Current status: PT eval completed and treatment initiated, pt s/p L knee osteotomy, TTWB, hinged knee brace locked in extension.  Pt previously I at home with spouse and children, 3 steps to enter with no rail and 12 to bed and bath with 1 rail.    Pt currently able to complete bed mob with SBA and A for set up, sit to stand with FWW with SBA, amb with FWW with TTWB with SBA 60', trail with crutches but not steady, a bit impulsive and and several LOB, returned to FWW for amb, stair training with 1 crutch and 1 rail with CGA, min A with 2 crutches an no rail x 3 steps.  Will need further training with spouse  Barriers to return to prior living situation: stairs with no rail with TTWB, needs furthre training  Recommendations for discharge: home with spouse to provide min A or less for mob and on stairs, home PT  Rationale for recommendations: pt will benefit from additional gait and stair training to ensure safe discharge to home with spouse and in home environment       Entered by: BAR YOUNG 10/10/2019 2:36 PM

## 2019-10-10 NOTE — ANESTHESIA POSTPROCEDURE EVALUATION
Anesthesia POST Procedure Evaluation    Patient: Sinai Shelley   MRN:     [id number] Gender:   female   Age:    44 year old :      1975        Preoperative Diagnosis: Chondral lesion [M94.9]   Procedure(s):  Left Knee Arthroscopy  Medial Femoral Condyle, Osteochondral With Allograft  Left Knee Proximal Tibial Osteotomy   Postop Comments: No value filed.       Anesthesia Type:  Not documented  Spinal, Peripheral Nerve Block    Reportable Event: NO     PAIN: Uncomplicated   Sign Out status: Comfortable, Well controlled pain     PONV: No PONV   Sign Out status:  No Nausea or Vomiting     Neuro/Psych: Uneventful perioperative course   Sign Out Status: Preoperative baseline; Age appropriate mentation     Airway/Resp.: Uneventful perioperative course   Sign Out Status: Non labored breathing, age appropriate RR; Resp. Status within EXPECTED Parameters     CV: Uneventful perioperative course   Sign Out status: Appropriate BP and perfusion indices; Appropriate HR/Rhythm     Disposition:   Sign Out in:  PACU  Disposition:  Phase II; Home  Recovery Course: Uneventful  Follow-Up: Not required           Last Anesthesia Record Vitals:  CRNA VITALS  10/9/2019 1837 - 10/9/2019 1937      10/9/2019             NIBP:  128/85    Ht Rate:  84          Last PACU Vitals:  Vitals Value Taken Time   /52 10/10/2019  1:27 AM   Temp 36.5  C (97.7  F) 10/10/2019 12:30 AM   Pulse 92 10/9/2019  8:25 PM   Resp 18 10/10/2019 12:30 AM   SpO2 98 % 10/10/2019  1:27 AM   Temp src     NIBP 128/85 10/9/2019  7:12 PM   Pulse     SpO2     Resp     Temp     Ht Rate 84 10/9/2019  7:12 PM   Temp 2           Electronically Signed By: Luc Winchester DO, October 10, 2019, 3:47 AM

## 2019-10-10 NOTE — ADDENDUM NOTE
Addendum  created 10/10/19 0917 by Blake John MD    Clinical Note Signed, Intraprocedure Blocks edited

## 2019-10-10 NOTE — PROGRESS NOTES
Care Coordinator - Discharge Planning    Admission Date/Time:  10/9/2019  Attending MD:  Chaparro Brooke MD     Data  Date of initial CC assessment:  10/10/19  Chart reviewed, discussed with interdisciplinary team.   Patient was admitted for:   1. Status post osteotomy         Assessment   Concerns with insurance coverage for discharge needs: None.  Current Living Situation: Patient lives with spouse.  Support System: Supportive  Services Involved: No services in home prior to admit  Transportation at Discharge: Family or friend will provide  Transportation to Medical Appointments:    - Not applicable  Barriers to Discharge: No barriers noted that would impact discharge to home      Coordination of Care and Referrals: Provided patient/family with options for Home Care.  This RNCC met with patient and spouse at bedside to discuss discharge planning. Patient has somewhere she goes ffor OP PT and does not have any preference for home care providers for home PT. Spouse will provide transport home at time of discharge.     Referral sent to [facility]      Plan  Anticipated Discharge Date:  1 day  Anticipated Discharge Plan:  Home with home PT        Mariposa DONAHUEN RN CCM  RN Care Coordinator [address] [place] [id number]  [email or web address]  Office: [phone]  Pager: [phone]    To contact Weekend RNCC, dial [phone] and enter job code [phone] at prompt. This pager can not be contacted by text page or outside line.

## 2019-10-10 NOTE — OR NURSING
PACU to Inpatient Nursing Handoff    Patient Sinai Shelley is a 44 year old female who speaks English.   Procedure Procedure(s):  Left Knee Arthroscopy  Medial Femoral Condyle, Osteochondral With Allograft  Left Knee Proximal Tibial Osteotomy   Surgeon(s) Primary: Chaparro Brooke MD  Assisting: Gustavo Collado PA-C  Resident - Assisting: Blake Menon MD     No Known Allergies    Isolation  No active isolations     Past Medical History   has a past medical history of Contact dermatitis and other eczema, due to unspecified cause, Ganglion, unspecified, Migraine, unspecified, without mention of intractable migraine without mention of status migrainosus, Nausea alone, Other specified disorders of biliary tract, Pain in joint, site unspecified, PONV (postoperative nausea and vomiting), Streptococcal sore throat, Unspecified gastritis and gastroduodenitis without mention of hemorrhage, and Unspecified vitamin D deficiency.    Anesthesia Other   Dermatome Level     Preop Meds Not applicable   Nerve block Adductor canal.  Location:left. Med:Exparel (liposomal bupivacaine). Time given:    Intraop Meds Not applicable   Local Meds No   Antibiotics cefazolin (Ancef) - last given at 1742     Pain Patient Currently in Pain: denies   PACU meds  Not applicable   PCA / epidural No   Capnography     Telemetry ECG Rhythm: Normal sinus rhythm   Inpatient Telemetry Monitor Ordered? Yes        Labs Glucose Lab Results   Component Value Date    GLC 75 12/20/2014       Hgb Lab Results   Component Value Date    HGB 13.4 08/18/2015       INR No results found for: INR   PACU Imaging Not applicable     Wound/Incision Incision/Surgical Site 10/09/19 Anterior;Left;Midline Knee (Active)   Incision Assessment WDL 10/9/2019  7:09 PM   Closure Sutures;Adhesive strip(s);Liquid bandage 10/9/2019  6:50 PM   Incision Drainage Amount None 10/9/2019  7:09 PM   Dressing Intervention Clean, dry, intact 10/9/2019  7:09 PM   Number  of days: 0      CMS        Equipment ice pack   Other LDA       IV Access Peripheral IV 10/09/19 Right;Dorsal Hand (Active)   Site Assessment WDL 10/9/2019 12:51 PM   Line Status Saline locked 10/9/2019 12:51 PM   Phlebitis Scale 0-->no symptoms 10/9/2019 12:51 PM   Dressing Intervention New dressing  10/9/2019 12:51 PM   Number of days: 0      Blood Products Not applicable  mL   Intake/Output Date 10/09/19 0700 - 10/10/19 0659   Shift 7125-6228 1096-0157 8359-6674 24 Hour Total   INTAKE   I.V.  750  750   Shift Total(mL/kg)  750(7)  750(7)   OUTPUT   Blood  150  150   Shift Total(mL/kg)  150(1.4)  150(1.4)   Weight (kg) 107.2 107.2 107.2 107.2      Drains / Javier     Time of void PreOp Void Prior to Procedure: 1215 (10/09/19 1241)    PostOp      Diapered? No   Bladder Scan     PO    tolerating sips and water     Vitals    B/P: 102/76  T: 97.5  F (36.4  C)    Temp src: Axillary  P:  Pulse: 90 (10/09/19 1345)    Heart Rate: 97 (10/09/19 1930)     R: 18  O2:  SpO2: 94 %    O2 Device: None (Room air) (10/09/19 1909)    Oxygen Delivery: 2 LPM (10/09/19 1350)         Family/support present  and daughter   Patient belongings     Patient transported on cart   DC meds/scripts (obs/outpt) Not applicable   Inpatient Pain Meds Released? Yes       Special needs/considerations None   Tasks needing completion None       Susan Olmstead, RN  [facility] [id number]

## 2019-10-11 ENCOUNTER — MEDICAL CORRESPONDENCE (OUTPATIENT)
Dept: HEALTH INFORMATION MANAGEMENT | Facility: CLINIC | Age: 44
End: 2019-10-11

## 2019-10-11 ENCOUNTER — APPOINTMENT (OUTPATIENT)
Dept: OCCUPATIONAL THERAPY | Facility: CLINIC | Age: 44
DRG: 489 | End: 2019-10-11
Attending: PHYSICIAN ASSISTANT
Payer: COMMERCIAL

## 2019-10-11 ENCOUNTER — APPOINTMENT (OUTPATIENT)
Dept: PHYSICAL THERAPY | Facility: CLINIC | Age: 44
DRG: 489 | End: 2019-10-11
Attending: ORTHOPAEDIC SURGERY
Payer: COMMERCIAL

## 2019-10-11 VITALS
HEART RATE: 95 BPM | WEIGHT: 236.33 LBS | OXYGEN SATURATION: 95 % | RESPIRATION RATE: 18 BRPM | TEMPERATURE: 96.4 F | HEIGHT: 65 IN | DIASTOLIC BLOOD PRESSURE: 69 MMHG | SYSTOLIC BLOOD PRESSURE: 131 MMHG | BODY MASS INDEX: 39.38 KG/M2

## 2019-10-11 LAB
ANION GAP SERPL CALCULATED.3IONS-SCNC: 6 MMOL/L (ref 3–14)
BUN SERPL-MCNC: 9 MG/DL (ref 7–30)
CALCIUM SERPL-MCNC: 8.2 MG/DL (ref 8.5–10.1)
CHLORIDE SERPL-SCNC: 109 MMOL/L (ref 94–109)
CO2 SERPL-SCNC: 26 MMOL/L (ref 20–32)
CREAT SERPL-MCNC: 0.74 MG/DL (ref 0.52–1.04)
GFR SERPL CREATININE-BSD FRML MDRD: >90 ML/MIN/{1.73_M2}
GLUCOSE SERPL-MCNC: 92 MG/DL (ref 70–99)
HGB BLD-MCNC: 11.5 G/DL (ref 11.7–15.7)
POTASSIUM SERPL-SCNC: 3.9 MMOL/L (ref 3.4–5.3)
SODIUM SERPL-SCNC: 141 MMOL/L (ref 133–144)

## 2019-10-11 PROCEDURE — 25000132 ZZH RX MED GY IP 250 OP 250 PS 637: Performed by: NURSE PRACTITIONER

## 2019-10-11 PROCEDURE — 97530 THERAPEUTIC ACTIVITIES: CPT | Mod: GP | Performed by: PHYSICAL THERAPIST

## 2019-10-11 PROCEDURE — 97535 SELF CARE MNGMENT TRAINING: CPT | Mod: GO,59 | Performed by: OCCUPATIONAL THERAPIST

## 2019-10-11 PROCEDURE — 85018 HEMOGLOBIN: CPT | Performed by: PHYSICIAN ASSISTANT

## 2019-10-11 PROCEDURE — 97116 GAIT TRAINING THERAPY: CPT | Mod: GP | Performed by: PHYSICAL THERAPIST

## 2019-10-11 PROCEDURE — G0378 HOSPITAL OBSERVATION PER HR: HCPCS

## 2019-10-11 PROCEDURE — 36415 COLL VENOUS BLD VENIPUNCTURE: CPT | Performed by: PHYSICIAN ASSISTANT

## 2019-10-11 PROCEDURE — 25000132 ZZH RX MED GY IP 250 OP 250 PS 637: Performed by: STUDENT IN AN ORGANIZED HEALTH CARE EDUCATION/TRAINING PROGRAM

## 2019-10-11 PROCEDURE — 80048 BASIC METABOLIC PNL TOTAL CA: CPT | Performed by: PHYSICIAN ASSISTANT

## 2019-10-11 PROCEDURE — 25000131 ZZH RX MED GY IP 250 OP 636 PS 637: Performed by: PHYSICIAN ASSISTANT

## 2019-10-11 PROCEDURE — 97165 OT EVAL LOW COMPLEX 30 MIN: CPT | Mod: GO | Performed by: OCCUPATIONAL THERAPIST

## 2019-10-11 PROCEDURE — 25000128 H RX IP 250 OP 636: Performed by: PHYSICIAN ASSISTANT

## 2019-10-11 PROCEDURE — 25000132 ZZH RX MED GY IP 250 OP 250 PS 637: Performed by: PHYSICIAN ASSISTANT

## 2019-10-11 RX ORDER — ONDANSETRON 4 MG/1
4 TABLET, ORALLY DISINTEGRATING ORAL EVERY 6 HOURS PRN
Qty: 14 TABLET | Refills: 0 | Status: SHIPPED | OUTPATIENT
Start: 2019-10-11 | End: 2021-07-16

## 2019-10-11 RX ORDER — TRAMADOL HYDROCHLORIDE 50 MG/1
50 TABLET ORAL EVERY 4 HOURS PRN
Qty: 30 TABLET | Refills: 0 | Status: SHIPPED | OUTPATIENT
Start: 2019-10-11 | End: 2021-07-16

## 2019-10-11 RX ORDER — AMOXICILLIN 250 MG
1 CAPSULE ORAL 2 TIMES DAILY
Qty: 24 TABLET | Refills: 0 | Status: SHIPPED | OUTPATIENT
Start: 2019-10-11 | End: 2021-07-16

## 2019-10-11 RX ORDER — METHOCARBAMOL 750 MG/1
750 TABLET, FILM COATED ORAL 3 TIMES DAILY PRN
Qty: 30 TABLET | Refills: 0 | Status: SHIPPED | OUTPATIENT
Start: 2019-10-11 | End: 2021-07-16

## 2019-10-11 RX ADMIN — PANTOPRAZOLE SODIUM 40 MG: 40 TABLET, DELAYED RELEASE ORAL at 07:58

## 2019-10-11 RX ADMIN — IBUPROFEN 600 MG: 600 TABLET, FILM COATED ORAL at 04:38

## 2019-10-11 RX ADMIN — LEVOTHYROXINE SODIUM 150 MCG: 150 TABLET ORAL at 07:58

## 2019-10-11 RX ADMIN — ASPIRIN 162 MG: 81 TABLET ORAL at 07:58

## 2019-10-11 RX ADMIN — IBUPROFEN 600 MG: 600 TABLET, FILM COATED ORAL at 12:50

## 2019-10-11 RX ADMIN — ACETAMINOPHEN 975 MG: 325 TABLET, FILM COATED ORAL at 10:11

## 2019-10-11 RX ADMIN — ONDANSETRON 4 MG: 4 TABLET, ORALLY DISINTEGRATING ORAL at 09:08

## 2019-10-11 RX ADMIN — LISDEXAMFETAMINE DIMESYLATE 50 MG: 50 CAPSULE ORAL at 07:58

## 2019-10-11 RX ADMIN — TRAMADOL HYDROCHLORIDE 50 MG: 50 TABLET, FILM COATED ORAL at 04:38

## 2019-10-11 RX ADMIN — MULTIPLE VITAMINS W/ MINERALS TAB 1 TABLET: TAB at 07:58

## 2019-10-11 RX ADMIN — ACETAMINOPHEN 975 MG: 325 TABLET, FILM COATED ORAL at 01:25

## 2019-10-11 ASSESSMENT — ACTIVITIES OF DAILY LIVING (ADL)
ADLS_ACUITY_SCORE: 15
ADLS_ACUITY_SCORE: 12
ADLS_ACUITY_SCORE: 15
ADLS_ACUITY_SCORE: 15

## 2019-10-11 NOTE — PROGRESS NOTES
10/11/19 0919   Quick Adds   Type of Visit Initial Occupational Therapy Evaluation   Living Environment   Lives With child(gavino), dependent;spouse   Living Arrangements house   Living Environment Comment Standard toilet; walk-in shower, tub   Self-Care   Usual Activity Tolerance good   Current Activity Tolerance fair   Equipment Currently Used at Home none   Activity/Exercise/Self-Care Comment Patient has 4 children (ages 4-18) with independence in ADLs/mobility without AE.   Functional Level   Ambulation 0-->independent   Transferring 0-->independent   Toileting 0-->independent   Bathing 0-->independent   Dressing 0-->independent   Eating 0-->independent   Communication 0-->understands/communicates without difficulty   Swallowing 0-->swallows foods/liquids without difficulty   Cognition 0 - no cognition issues reported   Fall history within last six months no   Prior Functional Level Comment Independent in ADLs/mobility without AE   General Information   Onset of Illness/Injury or Date of Surgery - Date 10/09/19   Referring Physician Dr. Brooke   Patient/Family Goals Statement return home to baseline   Additional Occupational Profile Info/Pertinent History of Current Problem POD #2 s/p Left knee diagnostic arthroscopy, open medial femoral condyle osteochondral grafting with allograft, left proximal tibial osteotomy on 10/9 with Dr. Brooke.   Precautions/Limitations other (see comments)  (HKB locked in extension when OOB)   Weight-Bearing Status - LLE toe touch weight-bearing   General Observations On RA, alert   Cognitive Status Examination   Cognitive Comment No cognitive concerns   Sensory Examination   Sensory Comments No N/T noted   Pain Assessment   Patient Currently in Pain Yes, see Vital Sign flowsheet   Mobility   Bed Mobility Bed mobility skill: Sit to supine;Bed mobility skill: Supine to sit   Bed Mobility Skill: Sit to Supine   Level of [place]: Sit/Supine stand-by assist   Bed Mobility Skill:  Supine to Sit   Level of [place]: Supine/Sit stand-by assist   Transfer Skill: Bed to Chair/Chair to Bed   Level of [place]: Bed to Chair stand-by assist   Weight-Bearing Restrictions toe touch weight-bearing   Assistive Device - Transfer Skill Bed to Chair Chair to Bed Rehab Eval standard walker   Transfer Skill: Sit to Stand   Level of [place]: Sit/Stand stand-by assist   Transfer Skill: Sit to Stand toe touch weight-bearing   Assistive Device for Transfer: Sit/Stand standard walker   Transfer Skill: Toilet Transfer   Level of [place]: Toilet stand-by assist   Assistive Device standard walker;grab bars   Bathing   Level of [place] minimum assist (75% patients effort)   Upper Body Dressing   Level of [place]: Dress Upper Body independent   Lower Body Dressing   Level of [place]: Dress Lower Body stand-by assist   Toileting   Level of [place]: Toilet stand-by assist   Grooming   Level of [place]: Grooming independent   Eating/Self Feeding   Level of [place]: Eating independent   Activities of Daily Living Analysis   Impairments Contributing to Impaired Activities of Daily Living pain;post surgical precautions;ROM decreased;strength decreased   General Therapy Interventions   Planned Therapy Interventions ADL retraining   Clinical Impression   Criteria for Skilled Therapeutic Interventions Met yes, treatment indicated   OT Diagnosis impaired self-cares/mobility   Influenced by the following impairments pain; decreased ROM   Assessment of Occupational Performance 1-3 Performance Deficits   Identified Performance Deficits dressing, bathing, toileting   Clinical Decision Making (Complexity) Low complexity   Therapy Frequency Daily   Predicted Duration of Therapy Intervention (days/wks) 1 day   Anticipated Discharge Disposition Home with Assist   Risks and Benefits of Treatment have been explained. Yes   Patient, Family & other staff in agreement with plan of care Yes  "  [facility] TM \"6 Clicks\"   2016, Trustees of [facility], under license to [employer].  All rights reserved.   6 Clicks Short Forms Daily Activity Inpatient Short Form   [facility]  \"6 Clicks\" Daily Activity Inpatient Short Form   1. Putting on and taking off regular lower body clothing? 4 - None   2. Bathing (including washing, rinsing, drying)? 3 - A Little   3. Toileting, which includes using toilet, bedpan or urinal? 4 - None   4. Putting on and taking off regular upper body clothing? 4 - None   5. Taking care of personal grooming such as brushing teeth? 4 - None   6. Eating meals? 4 - None   Daily Activity Raw Score (Score out of 24.Lower scores equate to lower levels of function) 23   Total Evaluation Time   Total Evaluation Time (Minutes) 8     "

## 2019-10-11 NOTE — PLAN OF CARE
VS: Stable.   O2: RA.   Output: Voiding adequately in bathroom.   Last BM: 10/10/19, passing flatus.   Activity: SBA with HKB and walker, TTWB.   Skin: Incision.   Pain: Constant, aching, throbbing pain in left knee managed with ibuprofen, tramadol, atarax, robaxin, scheduled tylenol, and ice.   CMS: Numbness in left knee.   Dressing: CDI.   Diet: Regular.   LDA: NO IV access.   Equipment: Walker, HKB, IS, PCD, foot pump, CPM, pillows, call light within reach.   Plan: Continue to monitor. Anticipate discharge.   Additional Info:

## 2019-10-11 NOTE — PLAN OF CARE
PT: Pt will benefit from a lightweight w/c  with L LE leg extension to access the community. Good tolerance to activity today, performing stairs with close SBA using crutch and railing as well as with just crutches (no rail). Pt and  report no concerns about discharging home once DME is issued.    Physical Therapy Discharge Summary    Reason for therapy discharge:    Discharged to home with home therapy.    Progress towards therapy goal(s). See goals on Care Plan in [facility] electronic health record for goal details.  Goals met    Therapy recommendation(s):    Continued therapy is recommended.  Rationale/Recommendations:  home safety assessment and to progress strength and function for maximum independence.  Issued 2WW, crutches.Recommend and are pursuing (via care coordinator) lightweight w/c with L LE elevatign foot rest to access community.

## 2019-10-11 NOTE — PROGRESS NOTES
Care Coordinator Progress Note    Admission Date/Time:  10/9/2019  Attending MD:  Chaparro Brooke MD    Data  Chart reviewed, discussed with interdisciplinary team.   Patient was admitted for:    Status post osteotomy  Chondral lesion  Acute pain of left knee.    Assessment  Lightweight manual wheelchair ordered from [employer]. To be delivered to patient's home. RNCC available as needed.    [employer]  Phone [phone]  Fax [phone]     Plan  Anticipated Discharge Date:  10/11/2019  Anticipated Discharge Plan:  Home with home care and DME    Maria Elena You RN, BSN  Care Coordinator, 8A  Phone [phone]  Pager [phone]

## 2019-10-11 NOTE — PROGRESS NOTES
Loss iv access, Toradol not given and patient wants to wait to see if Tramadol will work before inserting a new IV,

## 2019-10-11 NOTE — PROGRESS NOTES
Care Coordinator Progress Note    Admission Date/Time:  10/9/2019  Attending MD:  Chaparro Brooke MD    Data  Chart reviewed, discussed with interdisciplinary team.   Patient was admitted for:    Status post osteotomy  Chondral lesion  Acute pain of left knee.     Assessment  Finalized home care orders faxed to [facility]. RNCC available as needed.    [facility]  [place]  Phone [phone]  Fax [phone]     Plan  Anticipated Discharge Date:  10/11/2019  Anticipated Discharge Plan:  Home with home care.    Maria Elena You RN, BSN  Care Coordinator, 8A  Phone [phone]  Pager [phone]

## 2019-10-11 NOTE — PLAN OF CARE
VS: Temp: 96.4  F (35.8  C) Temp src: Axillary BP: 131/69 Pulse: 95 Heart Rate: 74 Resp: 18    O2: SpO2: 95 % O2 Device: None (Room air)      Output: Urine output adequate   Last BM: 10/10   Activity: Up with SBA, became dizzy when up in shower - resolved with drinking fluids and eating breakfast   Skin: Intact - incision   Pain: Managed with Ibuprofen, tylenol, and tramadol    CMS: Intact - numbness wearing off per pt report   Dressing: CDI   Diet: Regular diet, well tolerated. Zofran given x1, relieved    LDA: None   Equipment: CPM, PCDs   Plan: Discharge home   Additional Info:      Pt. discharged at 1300 to home. Pt. was accompanied by , and left with personal belongings. Prior to discharge, PIV was removed. Pt. received complete discharge paperwork and medications as filled by discharge pharmacy. Pt. was given times of last dose for all discharge medications in writing on discharge medication sheets.  Discharge teaching included medication, pain management, activity restrictions, dressing changes, and signs and symptoms of infection. Pt. had no further questions at the time of discharge and no unmet needs were identified.

## 2019-10-11 NOTE — PROGRESS NOTES
"Orthopedic Surgery Progress Note    Subjective: No acute events overnight. Working well with PT. Tolerating CPM. Pain generally controlled with PO, no nausea/emesis. Tolerating PO diet. Voiding spontaneously.  Denies cp, sob, calf pain, fevers, chills, sweats. Denies new onset numbness/tingling in operative extremity.    Exam:  /71 (BP Location: Right arm)   Pulse 95   Temp 97.6  F (36.4  C) (Oral)   Resp 16   Ht 1.651 m (5' 5\")   Wt 107.2 kg (236 lb 5.3 oz)   LMP 09/07/2019   SpO2 95%   BMI 39.33 kg/m    Gen: Awake, alert, NAD  Resp: breathing equal and non-labored  Extremities:    LLE: operative dressing c/d/i. Hinged knee brace in place. 5/5 EHL/FHL/TA/Gsc. SILT in s/s/dp/sp/t distributions. 2+ DP and PT pulses. Toes WWP      Labs:    Recent Labs   Lab 10/11/19  0614 10/10/19  0657   HGB 11.5* 12.0     No lab results found in last 7 days.  No lab results found in last 7 days.    Assessment:   44 year old female who is now s/p:    Procedure:  Left knee diagnostic arthroscopy, open medial femoral condyle osteochondral grafting with allograft, left proximal tibial osteotomy on 10/9 with Dr. Brooke.    Recovering appropriately.    Orthopedics Primary  Activity: Up with assist until independent.  Weight bearing status: TTWB with HKB locked in extension  Pain management: patient wishes to avoid narcotics. PO with IV toradol for breakthrough.  Antibiotics: Ancef x 24hrs   Diet: Begin with clear fluids and progress diet as tolerated.   DVT prophylaxis: SCDs, ASA 162mg  Imaging: None  Labs: Hgb POD#1  Bracing/Splinting: Keep HKB locked in extension.  Dressings: Keep c/d/i.  Change prior to discharge.  No submerging.  Shower POD5  Drains: None  Physical Therapy/Occupational Therapy: Eval and treat.  crutch training and ADLs.  CPM 0-30, advance as tolerated, 6-8hrs daily.  Consults: PT/OT  Follow-up: Clinic in 2 weeks for wound check, suture removal and xrays    Disposition: discharge home today with " family    Future Appointments   Date Time Provider Department Center   10/10/2019  2:00 PM Mayra Verdugo, PT URPT [place]   10/10/2019  7:00 PM UR OT OVERFLOW UROT [place]   10/21/2019  2:00 PM Karime Horn MD [facility]        Blake Menon, PGY4  Orthopedic Surgery Resident  Pager [phone]

## 2019-10-11 NOTE — PLAN OF CARE
"Discharge Planner OT   Patient plan for discharge: Home with assist  Current status: Patient reporting \"vertigo\", diaphoretic with shower transfer with nausea, dizziness, sweating.  BPs prior to activity 142/75 and after 131/69.  Able to complete basic ADLs; AE recommendations made.  OT called back into room as patient much more anxious regarding ADLs, wanting all AE, has many questions regarding home management.  Spouse present; answered questions and discussed home OT for transition home.  Barriers to return to prior living situation: Diaphoretic episode with mobility  Recommendations for discharge: Home with assist  Rationale for recommendations: No further inpatient or home OT needs; may benefit from wheelchair as has 4 children and very active with going to schools/sporting events    Occupational Therapy Discharge Summary    Reason for therapy discharge:    All goals and outcomes met, no further needs identified.    Progress towards therapy goal(s). See goals on Care Plan in [facility] electronic health record for goal details.  Goals met    Therapy recommendation(s):    Home OT recommended for home set-up, transition home, higher level ADLs/home management in patient's home as has difficulty leaving home without assist of caregiver.               Entered by: Deborah John 10/11/2019 9:04 AM       "

## 2019-10-13 NOTE — OP NOTE
Procedure Date: 10/09/2019      PREOPERATIVE DIAGNOSES:     1.  Left knee medial femoral condyle chondral lesion.   2.  Left knee genu varum.      POSTOPERATIVE DIAGNOSES:   1.  Left knee medial femoral condyle chondral lesion.   2.  Left knee genu varum.      SURGEON:  Chaparro Brooke MD.      ASSISTANT:  Gustavo Collado PA-C.  The physician assistant was necessary to provide retraction and assist in graft preparation and placement as well as assist in retraction for the osteotomy as well as graft preparation for the osteotomy.      SECOND ASSISTANT:  Timur Diop MD, Orthopedic Fellow, Blake Menon MD, orthopedic resident.      ANESTHETIC:  Spinal plus regional block.      DRAINS:  None.      COUNTS:  Sponge and needle count were correct.      MATERIAL FORWARDED TO THE LABORATORY:  None.      OPERATIONS PERFORMED:     1.  Left knee proximal tibial osteotomy.   2.  Left knee medial femoral condyle osteochondral allograft transplantation.      INDICATIONS:  Sinai Shelley is a 44-year-old female with a difficult left knee problem.  She has chondral wear primarily affecting her medial compartment.  She does have 3 degrees of varus malalignment.  She has failed nonoperative treatment.  We have had a long conversation about treatment options.  We discussed continued nonsurgical treatment.  We discussed arthroplasty.  We have discussed cartilage restoration.  Due to her young age and activity level, Sinai feels most comfortable undergoing cartilage restoration and proximal tibial osteotomy.  I spent time in clinic discussing the surgery as well as the risks including bleeding, infection, nerve damage, complications from anesthesia, blood clot, etc.  We also discussed the more pertinent risks with this type of surgery, including failure to relieve her symptoms.  We may make her symptoms worse.  She may lose range of motion or develop scar tissue that could be problematic.  I explained that this surgery does not  cure arthritis and her cartilage wear could progress over time requiring arthroplasty in the future.  There is a possibility of under-correction or overcorrection of the osteotomy.  The osteotomy may not heal.  There could be hardware complications.  There could be risks of the allograft such as unusual viral or bacterial infection.  Sinai had a chance to have her questions answered.  She understands the surgery as well as the surgical risks and would like to proceed.      OPERATIVE FINDINGS:  Examination under anesthesia reveals trace effusion.  Full range of motion.  Ligaments are stable.  The diagnostic arthroscopy reveals grade 3 chondral wear of the patella.  There is grade 2 to grade 3 wear of the central trochlea.  The lateral compartment reveals an intact lateral meniscus, normal articular cartilage.  The notch reveals intact cruciate ligaments.  The medial compartment reveals an intact medial meniscus.  Minimal chondral thinning of the tibial plateau.  There is a wide area of grade 3 and grade 4 chondral wear of the medial femoral condyle.  This measures approximately 40 mm in length x 20 mm in width.  There is no bone loss.      IMPLANTS:  JRF medial femoral condyle osteochondral allograft, Arthrex 6 degree iBalance implant with 6.5 PEEK bolts proximally and 4.5 PEEK bolts distally x2, allograft bone chips and DBX.      DESCRIPTION OF THE OPERATION:  After the patient was counseled, plans, alternatives and risks were discussed, consent was obtained.  The correct operative extremity was marked in the preoperative holding area.  Preoperative antibiotics were administered.  Regional block was administered.  The patient was brought back to the operating suite and administered a general anesthetic.  The examination under anesthesia was performed and the findings are noted above.  The left lower extremity was prepped and draped in the usual sterile fashion.  A timeout process was completed.        A standard  anterolateral arthroscopy portal was created.  A thorough diagnostic arthroscopy was undertaken and the findings are noted above.  A medial arthrotomy was created.  Hemostasis obtained with electrocautery.  This allowed exposure of the medial femoral condyle chondral lesion.  This was an extensive lesion and I felt it would be best to perform a BioUni implant.  A large (40 mm) x 20 mm sizer covered the vast majority of the chondral lesion.  We elected to create a graft this size.        Attention was turned to the graft.  A scoring device was used to score the graft.  The saw was used to cut the graft.  The graft fit nicely within the trial implant.  We did copiously lavage the graft with pulse lavage.  The graft was kept moist until implantation.        Attention was now turned to the medial femoral condyle.  The cutting devices for the BioUni set were used to ream the recipient socket for the graft.  The base was drilled to create bleeding channels.  The recipient socket was dilated.  The graft was gently impacted into place.  The fit was essentially anatomic posteriorly.  The graft was countersunk approximately 1 mm at the anterior aspect, which I felt was acceptable.  The knee was copiously lavaged and the arthrotomy was reapproximated.      At this point, attention was turned to the osteotomy.  An L-shaped periosteal flap was elevated.  The patellar tendon was identified and mobilized so it could be protected.  Careful subperiosteal dissection was carried along the medial and posterior aspect of the tibia.  At this point, fluoroscopy was positioned.  The iBalance guide was used to match the patient's tibial slope.  This allowed placement of the hinge pin approximately 1-1/2 times more distal from the articular cartilage than the lateral cortex nicely matching the tibial slope.  The sockets for the implants were reamed and this bone was saved.  The cutting guide was positioned and the neurovascular shield  positioned.  Fluoroscopy confirmed safe positioning.  The oscillating saw was used to create the osteotomy.  The osteotomy was now opened to 6 degrees.  The alignment dung revealed that the patient's long leg alignment fell on the lateral tibial spine which I felt was ideal.  A mixture of DBX allograft bone chips and the reamings from the patient's tibia were combined as bone grafting material.  The bone grafting material was packed within the osteotomy.  A 6 degree implant was now placed.  AP and lateral fluoroscopy confirmed nice position of the osteotomy.  The PEEK implant was now held in position with two 6.5 PEEK bolts proximally and two 4.5 PEEK bolts distally.  Again, fluoroscopy confirmed good positioning.  The knee was copiously irrigated.  The L-shaped flap was reapproximated over the top of the implant.  Skin closed in layers.  A sterile dressing was applied followed by a hinged knee brace.  The patient was brought to the recovery room in good condition.  She tolerated the procedure well.  There were no complications.  Estimated blood loss was less than 250 mL.  A tourniquet was not used.      DISPOSITION:  The patient will be admitted to the orthopedic floor for 24-48 hours for pain control and observation.  Her weightbearing status will be strict touchdown weightbearing.  Her range of motion will be as tolerated.  We will utilize a CPM machine starting 0-30 degrees for 6 hours per day and advancing to flexion as tolerated.  We will use a CPM machine for 6 hours.  We will use aspirin for DVT prophylaxis.  The patient will follow up at [facility] on postoperative day #8.  We will obtain an AP and lateral radiograph at that visit.         FRANCESCO ATKINS MD             D: 10/13/2019   T: 10/13/2019   MT: TROY      Name:     BLAISE BELTRAN   MRN:      [id number]        Account:        [id number]   :      1975           Procedure Date: 10/09/2019      Document: [id number]

## 2019-10-14 ENCOUNTER — TELEPHONE (OUTPATIENT)
Dept: ORTHOPEDICS | Facility: CLINIC | Age: 44
End: 2019-10-14

## 2019-10-14 ENCOUNTER — PATIENT OUTREACH (OUTPATIENT)
Dept: CARE COORDINATION | Facility: CLINIC | Age: 44
End: 2019-10-14

## 2019-10-14 NOTE — TELEPHONE ENCOUNTER
I called Sinai back after speaking with Katarzyna who said that this is a [facility] patient and to tell her to call [facility]. Patient had surgery at [facility] and is wondering about a wheel chair order that Maria Elena placed. I do not see an order. She will call [place], and then [facility] for further assistance.    Katherin Esquivel, ATC

## 2019-10-14 NOTE — TELEPHONE ENCOUNTER
[facility] Call Center    Phone Message    May a detailed message be left on voicemail: yes    Reason for Call: Other:     Sinai called in.  She has some follow up questions after surgery and is curious when she will get the wheel chair that was ordered.  Please call her back to discuss.     Action Taken: Message routed to:  [facility] ([facility] Ortho

## 2019-10-15 NOTE — DISCHARGE SUMMARY
ORTHOPAEDIC DISCHARGE SUMMARY     Date of Admission: 10/9/2019  Date of Discharge: 10/11/2019  1:09 PM  Disposition: Home  Staff Physician: No att. providers found  Primary Care Provider: Johnson Osorio    DISCHARGE DIAGNOSIS:  Chondral lesion [M94.9]    PROCEDURES: Procedure(s):  Left Knee Arthroscopy  Medial Femoral Condyle, Osteochondral With Allograft  Left Knee Proximal Tibial Osteotomy on 10/9/2019    BRIEF HISTORY:  43yo F now s/p the above surgery. The patient elected to pursue surgical management after thorough discussion of risks, benefits, and alternatives to surgery.     HOSPITAL COURSE:    Surgery was uncomplicated. Sinai Shelley has done well post-operatively. Medicine was consulted post operatively to aid in management of medical comorbidities. See final recommendations below. The patient received routine nursing cares and is medically stable. Vital signs are stable. The patient is tolerating a regular diet without GI distress/nausea or vomiting. Voiding spontaneously. All PT/OT goals have been met for safe mobility. Pain is now controlled on oral medications which will be available on discharge. Stool softeners have been used while taking pain medications to help prevent constipation. Sinai Shelley is deemed medically safe to discharge.     Antibiotics:  Ancef given periop and 24 hours postop.   DVT prophylaxis:  Mechanical, ASA 162mg daily  PT Progress: Has met PT/OT goals for safe mobility.    Pain Meds:  Weaned off all IV pain meds by discharge.  Inpatient Events: No significant events or complications.     Discharge orders and instructions as below.    FOLLOWUP:    Future Appointments   Date Time Provider Department Center   10/21/2019  2:00 PM [facility]       Appointments at [facility]: [address] [place] [id number]  Please call [phone] if you haven't heard regarding these  appointments within 7 days of discharge.    PLANNED DISCHARGE ORDERS:      Discharge Medication List as of 10/11/2019 12:23 PM      START taking these medications    Details   acetaminophen (TYLENOL) 325 MG tablet Continue 975 mg every 8 hours for three days, then reduce to 650 mg every 4 hours as needed for pain., Disp-1 Bottle, R-0, E-Prescribe      aspirin (ASA) 81 MG EC tablet Take 2 tablets (162 mg) by mouth daily, Disp-60 tablet, R-0, E-Prescribe      hydrOXYzine (ATARAX) 25 MG tablet Take 1 tablet (25 mg) by mouth every 6 hours as needed for itching, Disp-20 tablet, R-0, E-Prescribe      methocarbamol (ROBAXIN) 750 MG tablet Take 1 tablet (750 mg) by mouth 3 times daily as needed for muscle spasms, Disp-30 tablet, R-0, E-Prescribe      ondansetron (ZOFRAN-ODT) 4 MG ODT tab Take 1 tablet (4 mg) by mouth every 6 hours as needed for nausea or vomiting, Disp-14 tablet, R-0, E-Prescribe      !! order for DME Equipment being ordered: Bath Transfer Bench ()  Treatment Diagnosis: TTWB L LE, decreased ROM with limited mobility and independence in ADLsDisp-1 each, R-0, Local Print      !! order for DME Equipment being ordered: Walker Wheels (), Walker () and Crutches ()  Treatment Diagnosis: s/p L knee diagnostic arthroscopy, open medial femoral condyle osteochondral grafting with allograft, L proximal tibial osteotomyDisp-1 each, R-0,  Local Print      !! order for DME Equipment being ordered: Other: Toilet safety frame  Treatment Diagnosis: Limited mobility and independence in ADLsDisp-1 each, R-0, Local Print      senna-docusate (SENOKOT-S/PERICOLACE) 8.6-50 MG tablet Take 1 tablet by mouth 2 times daily, Disp-24 tablet, R-0, E-Prescribe      traMADol (ULTRAM) 50 MG tablet Take 1 tablet (50 mg) by mouth every 4 hours as needed for moderate pain, Disp-30 tablet, R-0, Local Print       !! - Potential duplicate medications found. Please discuss with provider.      CONTINUE these medications which  have NOT CHANGED    Details   amphetamine-dextroamphetamine (ADDERALL) 10 MG per tablet Historical      levothyroxine (SYNTHROID/LEVOTHROID) 150 MCG tablet TAKE 1 TABLET BY MOUTH DAILY, Disp-90 tablet, R-3, E-Prescribe      lisdexamfetamine (VYVANSE) 50 MG capsule Historical      Multiple Vitamin (MULTI-VITAMINS) TABS Take 1 tablet by mouth, Historical      pantoprazole (PROTONIX) 40 MG EC tablet Take 40 mg by mouth daily, Historical      spironolactone (ALDACTONE) 25 MG tablet Take 125 mg by mouth daily as needed (takes for acne flare ups), Historical               Discharge Procedure Orders   Home Care PT Referral for Hospital Discharge   Referral Priority: Routine Referral Type: Home Health Therapies & Aides   Number of Visits Requested: 1     Home care nursing referral   Referral Priority: Routine Referral Type: Home Health Therapies & Aides   Number of Visits Requested: 1     Home Care OT Referral for Hospital Discharge   Referral Priority: Routine   Number of Visits Requested: 1     MD face to face encounter   Order Comments: Documentation of Face to Face and Certification for Home Health Services    I certify that patient: Sinai Shelley is under my care and that I, or a nurse practitioner or physician's assistant working with me, had a face-to-face encounter that meets the physician face-to-face encounter requirements with this patient on: 10/10/2019.    This encounter with the patient was in whole, or in part, for the following medical condition, which is the primary reason for home health care: knee ostomy .    I certify that, based on my findings, the following services are medically necessary home health services: Physical Therapy.    My clinical findings support the need for the above services because: Physical Therapy Services are needed to assess and treat the following functional impairments: assist with continued ambulation and mobility status post  knee surgery.    Further, I certify  that my clinical findings support that this patient is homebound (i.e. absences from home require considerable and taxing effort and are for medical reasons or [employer] services or infrequently or of short duration when for other reasons) because: patient is unable to drive.    Based on the above findings. I certify that this patient is confined to the home and needs intermittent skilled nursing care, physical therapy and/or speech therapy.  The patient is under my care, and I have initiated the establishment of the plan of care.  This patient will be followed by a physician who will periodically review the plan of care.  Physician/Provider to provide follow up care: Johnson Osorio    Attending hospital physician (the Medicare certified [facility] provider): Chaparro Brooke MD  Physician Signature: See electronic signature associated with these discharge orders.  Date: 10/10/2019     Reason for your hospital stay   Order Comments: Postoperative pain control and mobilization with PT.     Activity   Order Comments: Your activity upon discharge: as directed by PT     Order Specific Question Answer Comments   Is discharge order? Yes      Discharge Instructions   Order Comments: Touch toe weight bearing with operative extremity while knee brace is locked in full extension. OK to unlock brace when in bed or working with therapist.     Discharge Instructions   Order Comments: FOLLOW UP APPOINTMENT  You are scheduled for a post operative wound check with Dr. Brooke's clinic approximately two weeks after surgery. At approximately eight weeks after surgery, you will see Dr. Brooke in clinic. During this visit, repeat X rays of your operative knee will be performed.      Your follow up appointments will be at the location that you regularly see Dr. Brooke:    [facility]  [address] [place] [id number] [phone]      ACTIVITY  Weight bearing status:    You may bear TOE TOUCH WEIGHT BEARING on your operative extremity, using assistive devices (walker, cane) as needed.     Continuous passive motion machine:   Perform CPM exercises for six to eight hours per day for the first four weeks after surgery. The CPM should be set at 0 degrees to flexion tolerance with a goal of 90 degrees. Advance the CPM settings aggressively in increments of 5 degrees every 30 minutes until goal is achieved.     Exercises:   Perform the following exercises at least three times per day for the first four weeks after surgery to prevent complications, such as blood clots in your legs:  1) Point and flex your feet  2) Move your ankle around in big circles  3) Wiggle your toes   Also, perform thigh muscle tightening exercises for 10 to 15 minutes at least three times per day for the first four weeks after surgery.    Athletic Activities:  Activities such as swimming, bicycling, jogging, running, and stop-and-go sports should be avoided until permitted by your provider.    Driving:  Driving is not permitted until directed by your provider. Typically, driving is restricted for three to four weeks after right knee surgery and three weeks after left knee surgery. Under no circumstance are you permitted to drive while using narcotic pain medications.    Return to Work:  You may return to work when directed by your provider. Typically, patients with desk/sitting jobs can return to work within two weeks while patients with heavy labor jobs can return to work around three months after surgery.      COMFORT AND PAIN MANAGEMENT  Elevation:   During times of inactivity throughout the first two weeks after surgery, make an effort to decrease swelling by elevating your operative extremity. This is most effectively done by lying down and placing several pillows lengthwise under your thigh and calf to raise your toes above the level of your nose. To ensure that you knee remains in full extension, do not  place pillows directly under your knee.     Icing:  An ice pack will be provided to control swelling and discomfort after surgery. Place a thin towel on your skin and apply the ice pack overtop. You may apply ice for 20 minutes as often as two times per hour.    Pain Medications:  You will be discharged with acetaminophen (Tylenol) and a narcotic medication for pain management after surgery. Acetaminophen is most effective when it is taken per the schedule outlined by your provider (every four, six, or eight hours as prescribed). You may safely use acetaminophen as prescribed for the first four weeks after surgery provided you do not exceed the maximum daily dose prescribed by your provider (usually 3000 mg - 4000 mg). The narcotic pain medication should only be taken on an as-needed basis when necessary and should be reserved for severe pain that is not controlled with scheduled acetaminophen. In the first three days following surgery, your symptoms may warrant use of the narcotic pain medication every three, four, or six hours as prescribed. After three days, focus your efforts on decreasing (tapering) use of narcotic medications.   The most successful tapering strategy is to first, decrease the dose (number of tablets) and second, decrease the interval (time in between doses). For example, if you begin taking two tablets every four hours after surgery, start your taper by decreasing one of these doses to one tablet. Every one to two days, decrease another dose to one tablet until you are eventually taking one tablet every four hours. Once this is achieved, focus on increasing the number of hours between doses, moving from one tablet every four hours to one tablet every six hours. As tolerated, continue to increase the interval to eight and twelve hours. Eventually, taper to one dose every evening and discontinue when no longer needed.      ANTICOAGULATION  Take aspirin daily as prescribed for a total of 6 weeks  after surgery.       WOUND CARE AND SHOWERING  Wound care:  A dressing change was performed in the hospital the day after your surgery and fresh 4x4s and wrap were placed over your incision. Keep this dressing in place for seven days after surgery. If the dressing becomes saturated, you may replace the 4x4s ACE wrap as needed. After seven days, remove the dressing. If there is no drainage from the incisions, the wound may be left open to air (see showering instructions below). If there is any discharge from the incisions, apply new sterile 4x4s and ACE wrap. Your surgical incision was closed with Dermabond (a surgical adhesive that is directly on the incision areas). The Dermabond should be left on until it falls off or is removed at your first office visit. Under no circumstance are you allowed to pick or scratch your incision. Please contact Dr. Brooke's office if you notice the followin) significant cloudy, bloody, or malodorous drainage from the incision, 2) excessive warmth and redness   around the incision.    Showering:  You may shower three days after surgery provided the surgical dressing is covered with saran wrap (or any other non-permeable covering) to allow the incisions to remain dry. Once the dressing is removed on the seventh day after surgery, you may continue to shower in the usual fashion, allowing water and soap to run over the operative leg. You are strictly prohibited from soaking or submerging the surgical wound underwater.     Tub Bathing:  Tub bathing, swimming, or any other activities that cause your incision to be submerged should be avoided until allowed by your provider. Typically, patients are allowed to return to these activities six weeks after surgery.      CONTACTING YOUR PHYSICIAN:  You may experience symptoms that require follow-up before your scheduled two week appointment. Please contact Dr. Brooke's office if you experience:  1) Pain in your knee that persists or worsens in  the first few days after surgery  2) Excessive redness or drainage of cloudy or bloody material from the wounds (Clear red tinted fluid and some mild drainage should be expected) or drainage of any kind five days after surgery  3) A temperature elevation greater than 101.5    4) Pain, swelling or redness in your calf  5) Numbness or weakness in your leg or foot      Regular business hours (Monday - Friday, 8am - 5pm):  [facility]: [phone]    After hours and weekends:  [facility] on call Orthopedic resident: [phone]     Diet   Order Comments: Follow this diet upon discharge: Regular     Order Specific Question Answer Comments   Is discharge order? Yes        Blake Menon MD  Orthopedic Surgery PGY4  [phone]

## 2019-10-16 ENCOUNTER — TELEPHONE (OUTPATIENT)
Dept: ORTHOPEDICS | Facility: CLINIC | Age: 44
End: 2019-10-16

## 2019-10-16 NOTE — TELEPHONE ENCOUNTER
Called patient to introduce Dr. Brooke  s [facility] Outcome study, which was explained and the patient agreed to participate. Registration tipsheet was emailed to patient and direct contact information was given for any questions or concerns.

## 2019-10-21 ENCOUNTER — OFFICE VISIT (OUTPATIENT)
Dept: ENDOCRINOLOGY | Facility: CLINIC | Age: 44
End: 2019-10-21
Payer: COMMERCIAL

## 2019-10-21 VITALS
BODY MASS INDEX: 34.49 KG/M2 | SYSTOLIC BLOOD PRESSURE: 118 MMHG | HEIGHT: 65 IN | WEIGHT: 207 LBS | DIASTOLIC BLOOD PRESSURE: 79 MMHG | HEART RATE: 90 BPM

## 2019-10-21 DIAGNOSIS — E89.0 POSTSURGICAL HYPOTHYROIDISM: Primary | ICD-10-CM

## 2019-10-21 DIAGNOSIS — E89.0 POSTSURGICAL HYPOTHYROIDISM: ICD-10-CM

## 2019-10-21 DIAGNOSIS — C73 THYROID CANCER (H): ICD-10-CM

## 2019-10-21 LAB
T4 FREE SERPL-MCNC: 1.42 NG/DL (ref 0.76–1.46)
TSH SERPL DL<=0.005 MIU/L-ACNC: 0.61 MU/L (ref 0.4–4)

## 2019-10-21 ASSESSMENT — PAIN SCALES - GENERAL: PAINLEVEL: MILD PAIN (2)

## 2019-10-21 ASSESSMENT — MIFFLIN-ST. JEOR: SCORE: 1589.83

## 2019-10-21 NOTE — PROGRESS NOTES
"Endocrinology Attending Physician Progress note    1.  Papillary thyroid carcinoma, 1.8 cm, + ETE, LN negative, pT3, pN0, pMx; She has been treated with total Tx. TNM stage I or II; MACIS < 6 assuming no distant mets.  She has not had RRA.  Labs today;   Neck US in one year prior to next appt.    In the past I had Suggested 123I TBS as next step but she hasn't had it    1/15/2021 Addendum: review of neck US performed months prior to next appt  1/13/2021 neck US:   Right level 5 # 1 low  0.4 x 0.4 x 0.4 cm   Right level 5 # 2 low 0.6 x 0.3 x 0.7 cm   Right level 7 # 1 0.4 x 0.3 x   Left level 6# 1 0.2 x 0.2 x 0.2 cm     2.  Post surgical hypothyroidism. Treat to target TSH < 0.4. On LT4 150 mcg/day.      3.  Lung nodules up to 4 mm  thought to be granulomatous disease by CXR and CT.     Karime Horn Md    Chief complaint/ HPI:  [facility] presents, along with her ,  for follow up of  papillary thyroid cancer and post surgical hypothyoridism. She was last seen by me 3/18.  At that time she was on  lT4  150 * 7.5/week, divided and we reduced the dose to 150 mcg/day. She has no showed and late cancelled a few times since our last appt.  Today she confirms she is on LT4 150 mg/day. She has no thyroid related concerns.  She underwent Left Knee Arthroscopy  Medial Femoral Condyle, Osteochondral With Allograft Left Knee Proximal Tibial Osteotomy on 10/9/2019. She will be non-weight bearing for 6-8 weeks. This all began about 1.5 years ago when she turned and was walking up a hill and she heard a large pop, which was later followed by a \"hole \" in the knee cartilage.      Her treatment course has been as follows:   5/5/15  total Tx removing  papillary thyroid carcinoma 1.8 cm with + ETE; LN negative 0/2  pT3pN0,pMx  MACIS 4.64 assuming complete and no distant  She has not had 131I RRA.     We have the following tumor marker data:  6/16/14: Tg < 0.1, JOE < 0.4, TSH 0.03  8/18/15: Tg < 0.1, JOE < 0.4, TSH " 0.47  8/16/16: Tg < 0.1, JOE < 0.4, TSH 0.01  3/6/17: Tg 0.14, JOE < 0.4, TSH 0.76, free T4 1.33 on LT4 150 mcg/day -   9/15/17: Tg < 0.1, JOE < 0.4, TSH < 0.01, free T4 1.15  3/5/18: Tg < 0.1, JOE < 0.4, TSH < 0.01, free T4 1.41 -   10/11/19: Ca 8.2, creatinine 0.74,   10/21/19: Tg < 0.1, JOE < 0.4, TSH 0.61, free T4 1.42 - results followed appt    images on PACS  9/9/13 chest CT: thyroid asymmetrical, right>  L; dense calcs isthmus region  8/18/16 neck US: no abnormal lymph nodes .   9/15/17 neck US : negative   9/15/17 chest CT - stable up to 4 mm lung nodules. - thought to be prior granulomatous infection.     ROS  Cardiac, respiratory, GI: negative  In wheelchair    Past Medical History:   Diagnosis Date     Contact dermatitis and other eczema, due to unspecified cause      Ganglion, unspecified      Lung nodules 2017    up to 4 mm; ? prior granulomatous     Migraine, unspecified, without mention of intractable migraine without mention of status migrainosus      Nausea alone      Obesity 2011    BMI 38     Other specified disorders of biliary tract      Pain in joint, site unspecified      Papillary thyroid carcinoma (H) 05/2015     PONV (postoperative nausea and vomiting)     slow to awaken from anesthesia     Post-surgical hypothyroidism 05/2015     Streptococcal sore throat      Unspecified gastritis and gastroduodenitis without mention of hemorrhage      Unspecified vitamin D deficiency      Past Surgical History:   Procedure Laterality Date     APPENDECTOMY       ARTHROSCOPY KNEE Left 10/9/2019    Procedure: Left Knee Arthroscopy;  Surgeon: Chaparro Brooke MD;  [facility]     ARTHROTOMY WITH FRESH OSTEOCHONDRAL ALLOGRAFT KNEE Left 10/9/2019    Procedure: Medial Femoral Condyle, Osteochondral With Allograft;  Surgeon: Chaparro Brooke MD;  [facility]     HYSTERECTOMY       LAPAROSCOPIC CHOLECYSTECTOMY  9/6/2013    Procedure: LAPAROSCOPIC CHOLECYSTECTOMY;  LAPAROSCOPIC CHOLECYSTECTOMY;   Surgeon: Chaparro Benton MD;  Location: [facility]     ORTHOPEDIC SURGERY      knee arthroscopy, arthrocentesis aspiration of ganglion cyst of left wrist     OSTEOTOMY TIBIA Left 10/9/2019    Procedure: Left Knee Proximal Tibial Osteotomy;  Surgeon: Chaparro Brooke MD;  [facility]     THYROIDECTOMY N/A 5/5/2015    Procedure: THYROIDECTOMY;  Surgeon: Rosenda Coughlin MD;  Location: [facility]     Current Outpatient Medications   Medication Sig Dispense Refill     acetaminophen (TYLENOL) 325 MG tablet Continue 975 mg every 8 hours for three days, then reduce to 650 mg every 4 hours as needed for pain. 1 Bottle 0     amphetamine-dextroamphetamine (ADDERALL) 10 MG per tablet        aspirin (ASA) 81 MG EC tablet Take 2 tablets (162 mg) by mouth daily 60 tablet 0     hydrOXYzine (ATARAX) 25 MG tablet Take 1 tablet (25 mg) by mouth every 6 hours as needed for itching 20 tablet 0     levothyroxine (SYNTHROID/LEVOTHROID) 150 MCG tablet TAKE 1 TABLET BY MOUTH DAILY 90 tablet 3     lisdexamfetamine (VYVANSE) 50 MG capsule        methocarbamol (ROBAXIN) 750 MG tablet Take 1 tablet (750 mg) by mouth 3 times daily as needed for muscle spasms 30 tablet 0     Multiple Vitamin (MULTI-VITAMINS) TABS Take 1 tablet by mouth       ondansetron (ZOFRAN-ODT) 4 MG ODT tab Take 1 tablet (4 mg) by mouth every 6 hours as needed for nausea or vomiting 14 tablet 0     order for DME Equipment being ordered: Bath Transfer Bench ()  Treatment Diagnosis: TTWB L LE, decreased ROM with limited mobility and independence in ADLs 1 each 0     order for DME Equipment being ordered: Walker Wheels (), Walker () and Crutches ()  Treatment Diagnosis: s/p L knee diagnostic arthroscopy, open medial femoral condyle osteochondral grafting with allograft, L proximal tibial osteotomy 1 each 0     order for DME Equipment being ordered: Other: Toilet safety frame  Treatment Diagnosis: Limited mobility and independence in ADLs 1  "each 0     pantoprazole (PROTONIX) 40 MG EC tablet Take 40 mg by mouth daily       senna-docusate (SENOKOT-S/PERICOLACE) 8.6-50 MG tablet Take 1 tablet by mouth 2 times daily 24 tablet 0     spironolactone (ALDACTONE) 25 MG tablet Take 125 mg by mouth daily as needed (takes for acne flare ups)       traMADol (ULTRAM) 50 MG tablet Take 1 tablet (50 mg) by mouth every 4 hours as needed for moderate pain 30 tablet 0     Family History   Problem Relation Age of Onset     Unknown/Adopted No family hx of      Social History     Tobacco Use     Smoking status: Never Smoker     Smokeless tobacco: Never Used   Substance Use Topics     Alcohol use: No     Drug use: No       GENERAL sitting in wheelchair with left Lower extremity extended, left foot in slipper  /79   Pulse 90   Ht 1.651 m (5' 5\")   Wt 93.9 kg (207 lb)   BMI 34.45 kg/m    SKIN: normal color, temperature, texture   HEENT: PER,  no scleral icterus, eyelid retraction, stare, lid lag, proptosis or conjunctival injection.    NECK: supple.  No visible neck masses, cervical adenopathy,  LUNGS: clear to auscultation bilaterally.   CARDIAC: RRR, S1, S2 without murmurs, rubs or gallops.    BACK: normal spinal contour.    NEURO: Alert, responds appropriately to questions,  moves all extremities,no tremor of the outstretched hand    ENDO THYROID LABS-[facility] Latest Ref Rng & Units 10/21/2019 3/5/2018   TSH 0.40 - 4.00 mU/L 0.61 <0.01 (L)   T4 FREE 0.76 - 1.46 ng/dL 1.42 1.41     ENDO THYROID LABS-[facility] Latest Ref Rng & Units 9/15/2017   TSH 0.40 - 4.00 mU/L <0.01 (L)   T4 FREE 0.76 - 1.46 ng/dL 1.15     EXAMINATION: US HEAD NECK SOFT TISSUE, 1/13/2021 12:48 PM      COMPARISON: Soft tissue neck ultrasound 9/15/2017     HISTORY: Postsurgical hypothyroidism; thyroid cancer     TECHNIQUE: Sonographic imaging performed of the neck to evaluate for  lymph nodes using grey scale and limited Doppler technique.     FINDINGS:     Lymph nodes are measured bilaterally with " measurements given in  transverse, AP, and craniocaudal dimensions as follows:     Right:  Level 1: None  Level 2: Hypoechoic, ovoid lymph node with suggestion of a fatty hilum  measuring 1.4 x 0.5 x 1.5 cm.  Level 3: None  Level 4: None  Level 5: Two ovoid, hypoechoic lymph nodes, with suggestion of a fatty  hilum, measuring 0.4 x 0.4 x 0.4 cm and 0.6 x 0.3 x 0.7 cm,  respectively.  Level 6: None  Level 7: Focal, ovoid lymph node, with suggestion of a fatty hilum,  measuring 0.4 x 0.3 x 0.5 cm.     Left:  Level 1: None  Level 2: Two hypoechoic, oval lymph nodes, with suggestion of a fatty  hilum, measuring 1.2 x 0.6 x 1.1 cm and 0.8 x 0.8 x 1.5 cm,  respectively.  Level 3: None  Level 4: None  Level 5: None  Level 6: Hypoechoic, ovoid lymph node with suggestion of a fatty  hilum, measuring 0.2 x 0.2 x 0.2 cm.  Level 7: Hypoechoic, ovoid lymph node with suggestion of a fatty hilum  measuring 0.3 x 0.2 x 0.3 cm.                                                                      IMPRESSION:  Soft tissue neck ultrasound with lymph node measurements as described  above. The lymph nodes demonstrate normal sonographic morphology.     I have personally reviewed the examination and initial interpretation  and I agree with the findings.     ALMA ASCENCIO MD

## 2019-10-21 NOTE — LETTER
"10/21/2019      RE: Sinai Shelley  [address]  [facility] [id number]       Endocrinology Attending Physician Progress note    1.  Papillary thyroid carcinoma, 1.8 cm, + ETE, LN negative, pT3, pN0, pMx; She has been treated with total Tx. TNM stage I or II; MACIS < 6 assuming no distant mets.  She has not had RRA.  Labs today;   Neck US in one year prior to next appt.    In the past I had Suggested 123I TBS as next step but she hasn't had it    2.  Post surgical hypothyroidism. Treat to target TSH < 0.4. On LT4 150 mcg/day.    3.  Lung nodules up to 4 mm  thought to be granulomatous disease by CXR and CT.     Karime Horn Md    Chief complaint/ HPI:  [facility] presents, along with her ,  for follow up of  papillary thyroid cancer and post surgical hypothyoridism. She was last seen by me 3/18.  At that time she was on  lT4  150 * 7.5/week, divided and we reduced the dose to 150 mcg/day. She has no showed and late cancelled a few times since our last appt.  Today she confirms she is on LT4 150 mg/day. She has no thyroid related concerns.  She underwent Left Knee Arthroscopy  Medial Femoral Condyle, Osteochondral With Allograft Left Knee Proximal Tibial Osteotomy on 10/9/2019. She will be non-weight bearing for 6-8 weeks. This all began about 1.5 years ago when she turned and was walking up a hill and she heard a large pop, which was later followed by a \"hole \" in the knee cartilage.    Her treatment course has been as follows:   5/5/15  total Tx removing  papillary thyroid carcinoma 1.8 cm with + ETE; LN negative 0/2  pT3pN0,pMx  MACIS 4.64 assuming complete and no distant  She has not had 131I RRA.     We have the following tumor marker data:  6/16/14: Tg < 0.1, JOE < 0.4, TSH 0.03  8/18/15: Tg < 0.1, JOE < 0.4, TSH 0.47  8/16/16: Tg < 0.1, JOE < 0.4, TSH 0.01  3/6/17: Tg 0.14, JOE < 0.4, TSH 0.76, free T4 1.33 on LT4 150 mcg/day -   9/15/17: Tg < 0.1, JOE < 0.4, TSH < 0.01, free T4 " 1.15  3/5/18: Tg < 0.1, JOE < 0.4, TSH < 0.01, free T4 1.41 -   10/11/19: Ca 8.2, creatinine 0.74,     images on PACS  9/9/13 chest CT: thyroid asymmetrical, right>  L; dense calcs isthmus region  8/18/16 neck US: no abnormal lymph nodes .   9/15/17 neck US : negative   9/15/17 chest CT - stable up to 4 mm lung nodules. - thought to be prior granulomatous infection.     ROS  Cardiac, respiratory, GI: negative  In wheelchair    Past Medical History:   Diagnosis Date     Contact dermatitis and other eczema, due to unspecified cause      Ganglion, unspecified      Lung nodules 2017    up to 4 mm; ? prior granulomatous     Migraine, unspecified, without mention of intractable migraine without mention of status migrainosus      Nausea alone      Obesity 2011    BMI 38     Other specified disorders of biliary tract      Pain in joint, site unspecified      Papillary thyroid carcinoma (H) 05/2015     PONV (postoperative nausea and vomiting)     slow to awaken from anesthesia     Post-surgical hypothyroidism 05/2015     Streptococcal sore throat      Unspecified gastritis and gastroduodenitis without mention of hemorrhage      Unspecified vitamin D deficiency      Past Surgical History:   Procedure Laterality Date     APPENDECTOMY       ARTHROSCOPY KNEE Left 10/9/2019    Procedure: Left Knee Arthroscopy;  Surgeon: Chaparro Brooke MD;  [facility]     ARTHROTOMY WITH FRESH OSTEOCHONDRAL ALLOGRAFT KNEE Left 10/9/2019    Procedure: Medial Femoral Condyle, Osteochondral With Allograft;  Surgeon: Chaparro Brooke MD;  [facility]     HYSTERECTOMY       LAPAROSCOPIC CHOLECYSTECTOMY  9/6/2013    Procedure: LAPAROSCOPIC CHOLECYSTECTOMY;  LAPAROSCOPIC CHOLECYSTECTOMY;  Surgeon: Chaparro Benton MD;  Location: [facility]     ORTHOPEDIC SURGERY      knee arthroscopy, arthrocentesis aspiration of ganglion cyst of left wrist     OSTEOTOMY TIBIA Left 10/9/2019    Procedure: Left Knee Proximal Tibial Osteotomy;   Surgeon: Chaparro Brooke MD;  [facility]     THYROIDECTOMY N/A 5/5/2015    Procedure: THYROIDECTOMY;  Surgeon: Rosenda Coughlin MD;  [facility]     Current Outpatient Medications   Medication Sig Dispense Refill     acetaminophen (TYLENOL) 325 MG tablet Continue 975 mg every 8 hours for three days, then reduce to 650 mg every 4 hours as needed for pain. 1 Bottle 0     amphetamine-dextroamphetamine (ADDERALL) 10 MG per tablet        aspirin (ASA) 81 MG EC tablet Take 2 tablets (162 mg) by mouth daily 60 tablet 0     hydrOXYzine (ATARAX) 25 MG tablet Take 1 tablet (25 mg) by mouth every 6 hours as needed for itching 20 tablet 0     levothyroxine (SYNTHROID/LEVOTHROID) 150 MCG tablet TAKE 1 TABLET BY MOUTH DAILY 90 tablet 3     lisdexamfetamine (VYVANSE) 50 MG capsule        methocarbamol (ROBAXIN) 750 MG tablet Take 1 tablet (750 mg) by mouth 3 times daily as needed for muscle spasms 30 tablet 0     Multiple Vitamin (MULTI-VITAMINS) TABS Take 1 tablet by mouth       ondansetron (ZOFRAN-ODT) 4 MG ODT tab Take 1 tablet (4 mg) by mouth every 6 hours as needed for nausea or vomiting 14 tablet 0     order for DME Equipment being ordered: Bath Transfer Bench ()  Treatment Diagnosis: TTWB L LE, decreased ROM with limited mobility and independence in ADLs 1 each 0     order for DME Equipment being ordered: Walker Wheels (), Walker () and Crutches ()  Treatment Diagnosis: s/p L knee diagnostic arthroscopy, open medial femoral condyle osteochondral grafting with allograft, L proximal tibial osteotomy 1 each 0     order for DME Equipment being ordered: Other: Toilet safety frame  Treatment Diagnosis: Limited mobility and independence in ADLs 1 each 0     pantoprazole (PROTONIX) 40 MG EC tablet Take 40 mg by mouth daily       senna-docusate (SENOKOT-S/PERICOLACE) 8.6-50 MG tablet Take 1 tablet by mouth 2 times daily 24 tablet 0     spironolactone (ALDACTONE) 25 MG tablet Take 125 mg by  "mouth daily as needed (takes for acne flare ups)       traMADol (ULTRAM) 50 MG tablet Take 1 tablet (50 mg) by mouth every 4 hours as needed for moderate pain 30 tablet 0     Family History   Problem Relation Age of Onset     Unknown/Adopted No family hx of      Social History     Tobacco Use     Smoking status: Never Smoker     Smokeless tobacco: Never Used   Substance Use Topics     Alcohol use: No     Drug use: No     GENERAL sitting in wheelchair with left Lower extremity extended, left foot in slipper  /79   Pulse 90   Ht 1.651 m (5' 5\")   Wt 93.9 kg (207 lb)   BMI 34.45 kg/m     SKIN: normal color, temperature, texture   HEENT: PER,  no scleral icterus, eyelid retraction, stare, lid lag, proptosis or conjunctival injection.    NECK: supple.  No visible neck masses, cervical adenopathy,  LUNGS: clear to auscultation bilaterally.   CARDIAC: RRR, S1, S2 without murmurs, rubs or gallops.    BACK: normal spinal contour.    NEURO: Alert, responds appropriately to questions,  moves all extremities,no tremor of the outstretched hand    ENDO THYROID LABS-[facility] Latest Ref Rng & Units 10/21/2019 3/5/2018   TSH 0.40 - 4.00 mU/L 0.61 <0.01 (L)   T4 FREE 0.76 - 1.46 ng/dL 1.42 1.41     ENDO THYROID LABS-[facility] Latest Ref Rng & Units 9/15/2017   TSH 0.40 - 4.00 mU/L <0.01 (L)   T4 FREE 0.76 - 1.46 ng/dL 1.15       Karime Hron MD  "

## 2019-10-21 NOTE — LETTER
"10/21/2019       RE: Sinai Shelley  [address] [facility] [address]     Dear Colleague,    Thank you for referring your patient, Sinai Shelley, to the [facility] ENDOCRINOLOGY at [facility]. Please see a copy of my visit note below.    Endocrinology Attending Physician Progress note    1.  Papillary thyroid carcinoma, 1.8 cm, + ETE, LN negative, pT3, pN0, pMx; She has been treated with total Tx. TNM stage I or II; MACIS < 6 assuming no distant mets.  She has not had RRA.  Labs today;   Neck US in one year prior to next appt.    In the past I had Suggested 123I TBS as next step but she hasn't had it    2.  Post surgical hypothyroidism. Treat to target TSH < 0.4. On LT4 150 mcg/day.      3.  Lung nodules up to 4 mm  thought to be granulomatous disease by CXR and CT.     Karime Horn Md    Chief complaint/ HPI:  [facility] presents, along with her ,  for follow up of  papillary thyroid cancer and post surgical hypothyoridism. She was last seen by me 3/18.  At that time she was on  lT4  150 * 7.5/week, divided and we reduced the dose to 150 mcg/day. She has no showed and late cancelled a few times since our last appt.  Today she confirms she is on LT4 150 mg/day. She has no thyroid related concerns.  She underwent Left Knee Arthroscopy  Medial Femoral Condyle, Osteochondral With Allograft Left Knee Proximal Tibial Osteotomy on 10/9/2019. She will be non-weight bearing for 6-8 weeks. This all began about 1.5 years ago when she turned and was walking up a hill and she heard a large pop, which was later followed by a \"hole \" in the knee cartilage.      Her treatment course has been as follows:   5/5/15  total Tx removing  papillary thyroid carcinoma 1.8 cm with + ETE; LN negative 0/2  pT3pN0,pMx  MACIS 4.64 assuming complete and no distant  She has not had 131I RRA.     We have the following tumor marker data:  6/16/14: Tg < 0.1, JOE < 0.4, TSH " 0.03  8/18/15: Tg < 0.1, JOE < 0.4, TSH 0.47  8/16/16: Tg < 0.1, JOE < 0.4, TSH 0.01  3/6/17: Tg 0.14, JOE < 0.4, TSH 0.76, free T4 1.33 on LT4 150 mcg/day -   9/15/17: Tg < 0.1, JOE < 0.4, TSH < 0.01, free T4 1.15  3/5/18: Tg < 0.1, JOE < 0.4, TSH < 0.01, free T4 1.41 -   10/11/19: Ca 8.2, creatinine 0.74,     images on PACS  9/9/13 chest CT: thyroid asymmetrical, right>  L; dense calcs isthmus region  8/18/16 neck US: no abnormal lymph nodes .   9/15/17 neck US : negative   9/15/17 chest CT - stable up to 4 mm lung nodules. - thought to be prior granulomatous infection.     ROS  Cardiac, respiratory, GI: negative  In wheelchair    Past Medical History:   Diagnosis Date     Contact dermatitis and other eczema, due to unspecified cause      Ganglion, unspecified      Lung nodules 2017    up to 4 mm; ? prior granulomatous     Migraine, unspecified, without mention of intractable migraine without mention of status migrainosus      Nausea alone      Obesity 2011    BMI 38     Other specified disorders of biliary tract      Pain in joint, site unspecified      Papillary thyroid carcinoma (H) 05/2015     PONV (postoperative nausea and vomiting)     slow to awaken from anesthesia     Post-surgical hypothyroidism 05/2015     Streptococcal sore throat      Unspecified gastritis and gastroduodenitis without mention of hemorrhage      Unspecified vitamin D deficiency      Past Surgical History:   Procedure Laterality Date     APPENDECTOMY       ARTHROSCOPY KNEE Left 10/9/2019    Procedure: Left Knee Arthroscopy;  Surgeon: Chaparro Brooke MD;  [facility]     ARTHROTOMY WITH FRESH OSTEOCHONDRAL ALLOGRAFT KNEE Left 10/9/2019    Procedure: Medial Femoral Condyle, Osteochondral With Allograft;  Surgeon: Chaparro Brooke MD;  [facility]     HYSTERECTOMY       LAPAROSCOPIC CHOLECYSTECTOMY  9/6/2013    Procedure: LAPAROSCOPIC CHOLECYSTECTOMY;  LAPAROSCOPIC CHOLECYSTECTOMY;  Surgeon: Chaparro Benton MD;   Location: [facility]     ORTHOPEDIC SURGERY      knee arthroscopy, arthrocentesis aspiration of ganglion cyst of left wrist     OSTEOTOMY TIBIA Left 10/9/2019    Procedure: Left Knee Proximal Tibial Osteotomy;  Surgeon: Chaparro Brooke MD;  [facility]     THYROIDECTOMY N/A 5/5/2015    Procedure: THYROIDECTOMY;  Surgeon: Rosenda Coughlin MD;  [facility]     Current Outpatient Medications   Medication Sig Dispense Refill     acetaminophen (TYLENOL) 325 MG tablet Continue 975 mg every 8 hours for three days, then reduce to 650 mg every 4 hours as needed for pain. 1 Bottle 0     amphetamine-dextroamphetamine (ADDERALL) 10 MG per tablet        aspirin (ASA) 81 MG EC tablet Take 2 tablets (162 mg) by mouth daily 60 tablet 0     hydrOXYzine (ATARAX) 25 MG tablet Take 1 tablet (25 mg) by mouth every 6 hours as needed for itching 20 tablet 0     levothyroxine (SYNTHROID/LEVOTHROID) 150 MCG tablet TAKE 1 TABLET BY MOUTH DAILY 90 tablet 3     lisdexamfetamine (VYVANSE) 50 MG capsule        methocarbamol (ROBAXIN) 750 MG tablet Take 1 tablet (750 mg) by mouth 3 times daily as needed for muscle spasms 30 tablet 0     Multiple Vitamin (MULTI-VITAMINS) TABS Take 1 tablet by mouth       ondansetron (ZOFRAN-ODT) 4 MG ODT tab Take 1 tablet (4 mg) by mouth every 6 hours as needed for nausea or vomiting 14 tablet 0     order for DME Equipment being ordered: Bath Transfer Bench ()  Treatment Diagnosis: TTWB L LE, decreased ROM with limited mobility and independence in ADLs 1 each 0     order for DME Equipment being ordered: Walker Wheels (), Walker () and Crutches ()  Treatment Diagnosis: s/p L knee diagnostic arthroscopy, open medial femoral condyle osteochondral grafting with allograft, L proximal tibial osteotomy 1 each 0     order for DME Equipment being ordered: Other: Toilet safety frame  Treatment Diagnosis: Limited mobility and independence in ADLs 1 each 0     pantoprazole (PROTONIX) 40 MG  "EC tablet Take 40 mg by mouth daily       senna-docusate (SENOKOT-S/PERICOLACE) 8.6-50 MG tablet Take 1 tablet by mouth 2 times daily 24 tablet 0     spironolactone (ALDACTONE) 25 MG tablet Take 125 mg by mouth daily as needed (takes for acne flare ups)       traMADol (ULTRAM) 50 MG tablet Take 1 tablet (50 mg) by mouth every 4 hours as needed for moderate pain 30 tablet 0     Family History   Problem Relation Age of Onset     Unknown/Adopted No family hx of      Social History     Tobacco Use     Smoking status: Never Smoker     Smokeless tobacco: Never Used   Substance Use Topics     Alcohol use: No     Drug use: No       GENERAL sitting in wheelchair with left Lower extremity extended, left foot in slipper  /79   Pulse 90   Ht 1.651 m (5' 5\")   Wt 93.9 kg (207 lb)   BMI 34.45 kg/m     SKIN: normal color, temperature, texture   HEENT: PER,  no scleral icterus, eyelid retraction, stare, lid lag, proptosis or conjunctival injection.    NECK: supple.  No visible neck masses, cervical adenopathy,  LUNGS: clear to auscultation bilaterally.   CARDIAC: RRR, S1, S2 without murmurs, rubs or gallops.    BACK: normal spinal contour.    NEURO: Alert, responds appropriately to questions,  moves all extremities,no tremor of the outstretched hand    ENDO THYROID LABS-[facility] Latest Ref Rng & Units 10/21/2019 3/5/2018   TSH 0.40 - 4.00 mU/L 0.61 <0.01 (L)   T4 FREE 0.76 - 1.46 ng/dL 1.42 1.41     ENDO THYROID LABS-[facility] Latest Ref Rng & Units 9/15/2017   TSH 0.40 - 4.00 mU/L <0.01 (L)   T4 FREE 0.76 - 1.46 ng/dL 1.15       Again, thank you for allowing me to participate in the care of your patient.      Sincerely,    Karime Horn MD      "

## 2019-10-28 LAB — LAB SCANNED RESULT: NORMAL

## 2019-11-06 DIAGNOSIS — C73 THYROID CANCER (H): ICD-10-CM

## 2019-11-06 DIAGNOSIS — E89.0 POSTSURGICAL HYPOTHYROIDISM: Primary | ICD-10-CM

## 2020-03-02 ENCOUNTER — HEALTH MAINTENANCE LETTER (OUTPATIENT)
Age: 45
End: 2020-03-02

## 2020-04-06 ENCOUNTER — TELEPHONE (OUTPATIENT)
Dept: ENDOCRINOLOGY | Facility: CLINIC | Age: 45
End: 2020-04-06

## 2020-04-06 DIAGNOSIS — C73 THYROID CANCER (H): ICD-10-CM

## 2020-04-06 DIAGNOSIS — E89.0 POSTSURGICAL HYPOTHYROIDISM: ICD-10-CM

## 2020-04-06 RX ORDER — LEVOTHYROXINE SODIUM 150 UG/1
150 TABLET ORAL DAILY
Qty: 90 TABLET | Refills: 1 | Status: SHIPPED | OUTPATIENT
Start: 2020-04-06 | End: 2020-10-10

## 2020-04-06 NOTE — TELEPHONE ENCOUNTER
[facility] Call Center    Phone Message    May a detailed message be left on voicemail: yes     Reason for Call: Medication Refill Request    Has the patient contacted the pharmacy for the refill? Yes   Name of medication being requested: levothyroxine (SYNTHROID/LEVOTHROID) 150 MCG tablet  Provider who prescribed the medication: Justina  Pharmacy: [facility] [address] in [place]  Date medication is needed: 4/6/2020     Pt is needing a 90 day supply. Please follow up when completed. Thank you      Action Taken: Message routed to:  [facility] ([facility]): Endocrine    Travel Screening: Not Applicable

## 2020-08-03 NOTE — PLAN OF CARE
[facility]      OUTPATIENT OCCUPATIONAL THERAPY  EVALUATION  PLAN OF TREATMENT FOR OUTPATIENT REHABILITATION  (COMPLETE FOR INITIAL CLAIMS ONLY)  Patient's Last Name, First Name, M.I.  YOB: 1975  Sinai Maguire                          Provider's Name  [facility] Medical Record No.  [id number]                               Onset Date:  10/09/19   Start of Care Date:        Type:     ___PT   _X_OT   ___SLP Medical Diagnosis:                           OT Diagnosis:  impaired self-cares/mobility   Visits from SOC:  1   _________________________________________________________________________________  Plan of Treatment/Functional Goals    Planned Interventions: ADL retraining,       Goals: See Occupational Therapy Goals on Care Plan in [employer] electronic health record.    Therapy Frequency: Daily  Predicted Duration of Therapy Intervention: 1 day  _________________________________________________________________________________    I CERTIFY THE NEED FOR THESE SERVICES FURNISHED UNDER        THIS PLAN OF TREATMENT AND WHILE UNDER MY CARE     (Physician co-signature of this document indicates review and certification of the therapy plan).                 ,      Referring Physician: Dr. Brooke            Initial Assessment        See Occupational Therapy evaluation dated   in Epic electronic health record.

## 2020-08-10 NOTE — PLAN OF CARE
[facility]      OUTPATIENT PHYSICAL THERAPY EVALUATION  PLAN OF TREATMENT FOR OUTPATIENT REHABILITATION  (COMPLETE FOR INITIAL CLAIMS ONLY)  Patient's Last Name, First Name, M.I.  YOB: 1975  Sinai Maguire                        Provider's Name  [facility] Medical Record No.  [id number]                               Onset Date:  10/09/19   Start of Care Date:  10/10/19      Type:     _X_PT   ___OT   ___SLP Medical Diagnosis:  Knee arthroscopy                        PT Diagnosis:  difficulty walking   Visits from SOC:  1   _________________________________________________________________________________  Plan of Treatment/Functional Goals    Planned Interventions:  ,    gait training,       Goals: See Physical Therapy Goals on Care Plan in [employer] electronic health record.    Therapy Frequency: 2x/day  Predicted Duration of Therapy Intervention: 2 days  _________________________________________________________________________________    I CERTIFY THE NEED FOR THESE SERVICES FURNISHED UNDER        THIS PLAN OF TREATMENT AND WHILE UNDER MY CARE .             Physician Signature               Date    X_____________________________________________________                    Certification date from: 10/10/19, Certification date to: 10/10/19    Referring Physician: Edwardo            Initial Assessment        See Physical Therapy evaluation dated 10/10/19 in Epic electronic health record.

## 2020-09-11 NOTE — PLAN OF CARE
[facility]      OUTPATIENT PHYSICAL THERAPY EVALUATION  PLAN OF TREATMENT FOR OUTPATIENT REHABILITATION  (COMPLETE FOR INITIAL CLAIMS ONLY)  Patient's Last Name, First Name, M.I.  YOB: 1975  Sinai Maguire                        Provider's Name  [facility] Medical Record No.  [id number]                               Onset Date:  10/09/19   Start of Care Date:  10/10/19      Type:     _X_PT   ___OT   ___SLP Medical Diagnosis:  Knee arthroscopy                        PT Diagnosis:  difficulty walking   Visits from SOC:  1   _________________________________________________________________________________  Plan of Treatment/Functional Goals    Planned Interventions:  ,    gait training,       Goals: See Physical Therapy Goals on Care Plan in [employer] electronic health record.    Therapy Frequency: 2x/day  Predicted Duration of Therapy Intervention: 2 days  _________________________________________________________________________________    I CERTIFY THE NEED FOR THESE SERVICES FURNISHED UNDER        THIS PLAN OF TREATMENT AND WHILE UNDER MY CARE     (Physician co-signature of this document indicates review and certification of the therapy plan).              Certification date from: 10/10/19, Certification date to: 10/10/19    Referring Physician: Edwardo            Initial Assessment        See Physical Therapy evaluation dated 10/10/19 in Epic electronic health record.

## 2020-09-21 NOTE — PLAN OF CARE
[facility]      OUTPATIENT PHYSICAL THERAPY EVALUATION  PLAN OF TREATMENT FOR OUTPATIENT REHABILITATION  (COMPLETE FOR INITIAL CLAIMS ONLY)  Patient's Last Name, First Name, M.I.  YOB: 1975  Sinai Maguire                        Provider's Name  [facility] Medical Record No.  [id number]                               Onset Date:  10/09/19   Start of Care Date:  10/10/19      Type:     _X_PT   ___OT   ___SLP Medical Diagnosis:  Knee arthroscopy                        PT Diagnosis:  difficulty walking   Visits from SOC:  1   _________________________________________________________________________________  Plan of Treatment/Functional Goals    Planned Interventions:  ,    gait training,       Goals: See Physical Therapy Goals on Care Plan in [employer] electronic health record.    Therapy Frequency: 2x/day  Predicted Duration of Therapy Intervention: 2 days  _________________________________________________________________________________    I CERTIFY THE NEED FOR THESE SERVICES FURNISHED UNDER        THIS PLAN OF TREATMENT AND WHILE UNDER MY CARE     (Physician co-signature of this document indicates review and certification of the therapy plan).              Certification date from: 10/10/19, Certification date to: 10/10/19    Referring Physician: Edwardo            Initial Assessment        See Physical Therapy evaluation dated 10/10/19 in Epic electronic health record.

## 2020-10-07 DIAGNOSIS — E89.0 POSTSURGICAL HYPOTHYROIDISM: Primary | ICD-10-CM

## 2020-10-07 DIAGNOSIS — C73 THYROID CANCER (H): ICD-10-CM

## 2020-10-10 RX ORDER — LEVOTHYROXINE SODIUM 150 UG/1
150 TABLET ORAL DAILY
Qty: 90 TABLET | Refills: 0 | Status: SHIPPED | OUTPATIENT
Start: 2020-10-10 | End: 2021-01-06

## 2020-12-20 ENCOUNTER — HEALTH MAINTENANCE LETTER (OUTPATIENT)
Age: 45
End: 2020-12-20

## 2021-01-02 DIAGNOSIS — E89.0 POSTSURGICAL HYPOTHYROIDISM: ICD-10-CM

## 2021-01-02 DIAGNOSIS — C73 THYROID CANCER (H): ICD-10-CM

## 2021-01-06 DIAGNOSIS — C73 THYROID CANCER (H): ICD-10-CM

## 2021-01-06 DIAGNOSIS — E89.0 POSTSURGICAL HYPOTHYROIDISM: Primary | ICD-10-CM

## 2021-01-06 RX ORDER — LEVOTHYROXINE SODIUM 150 UG/1
150 TABLET ORAL DAILY
Qty: 90 TABLET | Refills: 4 | Status: SHIPPED | OUTPATIENT
Start: 2021-01-06 | End: 2022-03-23

## 2021-01-06 NOTE — TELEPHONE ENCOUNTER
LEVOTHYROXINE 150 MCG TABLET   Last Written Prescription Date:  10/10/2020  Last Fill Quantity: 90,   # refills: 0  Last Office Visit : 10/21/2019  Future Office visit:  7/19/2021    Routing refill request to provider for review/approval because:  Second Request       Labs due: TSH  Refer to Provider for review     Recent Labs   Lab Test 10/21/19  1425   TSH 0.61       Alyssa Jean RN  Central Triage Red Flags/Med Refills

## 2021-01-13 ENCOUNTER — ANCILLARY PROCEDURE (OUTPATIENT)
Dept: ULTRASOUND IMAGING | Facility: CLINIC | Age: 46
End: 2021-01-13
Payer: COMMERCIAL

## 2021-01-13 DIAGNOSIS — E89.0 POSTSURGICAL HYPOTHYROIDISM: ICD-10-CM

## 2021-01-13 DIAGNOSIS — C73 THYROID CANCER (H): ICD-10-CM

## 2021-01-13 LAB
T4 FREE SERPL-MCNC: 1.35 NG/DL (ref 0.76–1.46)
TSH SERPL DL<=0.005 MIU/L-ACNC: 0.2 MU/L (ref 0.4–4)

## 2021-01-13 PROCEDURE — 36415 COLL VENOUS BLD VENIPUNCTURE: CPT | Performed by: PATHOLOGY

## 2021-01-13 PROCEDURE — 84443 ASSAY THYROID STIM HORMONE: CPT | Performed by: PATHOLOGY

## 2021-01-13 PROCEDURE — 99N1030 PR STATISTIC REMEASURE THYROGLOBULIN: Mod: 90 | Performed by: PATHOLOGY

## 2021-01-13 PROCEDURE — 84439 ASSAY OF FREE THYROXINE: CPT | Performed by: PATHOLOGY

## 2021-01-13 PROCEDURE — 99000 SPECIMEN HANDLING OFFICE-LAB: CPT | Performed by: PATHOLOGY

## 2021-01-13 PROCEDURE — 84432 ASSAY OF THYROGLOBULIN: CPT | Mod: 90 | Performed by: PATHOLOGY

## 2021-01-13 PROCEDURE — 86800 THYROGLOBULIN ANTIBODY: CPT | Mod: 90 | Performed by: PATHOLOGY

## 2021-01-13 PROCEDURE — 76536 US EXAM OF HEAD AND NECK: CPT | Mod: GC | Performed by: RADIOLOGY

## 2021-01-20 LAB — LAB SCANNED RESULT: NORMAL

## 2021-02-28 ENCOUNTER — HEALTH MAINTENANCE LETTER (OUTPATIENT)
Age: 46
End: 2021-02-28

## 2021-04-18 ENCOUNTER — HEALTH MAINTENANCE LETTER (OUTPATIENT)
Age: 46
End: 2021-04-18

## 2021-07-16 NOTE — PROGRESS NOTES
Sinai Shelley  is being evaluated via a billable video visit.      How would you like to obtain your AVS? [employer]     For the video visit, send the invitation by: Text to cell phone: [phone]   Will anyone else be joining your video visit? Josey MUELLER MA

## 2021-07-19 ENCOUNTER — VIRTUAL VISIT (OUTPATIENT)
Dept: ENDOCRINOLOGY | Facility: CLINIC | Age: 46
End: 2021-07-19
Payer: COMMERCIAL

## 2021-07-19 DIAGNOSIS — C73 THYROID CANCER (H): ICD-10-CM

## 2021-07-19 DIAGNOSIS — E89.0 POSTSURGICAL HYPOTHYROIDISM: Primary | ICD-10-CM

## 2021-07-19 PROCEDURE — 99214 OFFICE O/P EST MOD 30 MIN: CPT | Mod: 95

## 2021-07-19 NOTE — PROGRESS NOTES
Endocrinology video visit Progress note    1.  Papillary thyroid carcinoma, 1.8 cm, + ETE, LN negative, pT3, pN0, pMx; She has been treated with total Tx. TNM stage I or II; MACIS < 6 assuming no distant mets.  She has not had RRA.   In the past I had Suggested 123I TBS as next step but she hasn't had it  Next labs and US in a year, just before next appt in a year.     2.  Post surgical hypothyroidism. Treat to target TSH < 0.4. On LT4 150 mcg/day.    3.  Lung nodules up to 4 mm  thought to be granulomatous disease by CXR and CT.     Due to the COVID 19 pandemic this visit was a telephone/video visit. The patient gave verbal consent for the visit today.    I have independently reviewed and interpreted labs, imaging as indicated.     Chart review/prep time 1  [id number]  Visit Start time 1320  Visit Stop time 1331  _22_ minutes spent on the date of the encounter doing chart review, history and exam, documentation and further activities as noted above.    Karime Horn Md    Chief complaint/ HPI:  Sinai presents for follow up of  papillary thyroid cancer and post surgical hypothyoridism. She was last seen by me 10/2019.  At that time she was on  lT4   150 mcg/day.       The thyroid cancer treatment course has been as follows:   5/5/15  total Tx removing  papillary thyroid carcinoma 1.8 cm with + ETE; LN negative 0/2  pT3pN0,pMx  MACIS 4.64 assuming complete and no distant  She has not had 131I RRA.     We have the following tumor marker data:  6/16/14: Tg < 0.1, JOE < 0.4, TSH 0.03  surgery  8/18/15: Tg < 0.1, JOE < 0.4, TSH 0.47  8/16/16: Tg < 0.1, JOE < 0.4, TSH 0.01  3/6/17: Tg 0.14, JOE < 0.4, TSH 0.76, free T4 1.33 on LT4 150 mcg/day -   9/15/17: Tg < 0.1, JOE < 0.4, TSH < 0.01, free T4 1.15  3/5/18: Tg < 0.1, JOE < 0.4, TSH < 0.01, free T4 1.41 -   10/21/19: Tg < 0.1, JOE < 0.4, TSH 0.61, free T4 1.42 -  1/31/2021 Tg < 0.1, JOE < 0.4, TSH 0.2, free T4 1.35    images on PACS  9/9/13 chest CT: thyroid  asymmetrical, right>  L; dense calcs isthmus region  8/18/16 neck US: no abnormal lymph nodes .   9/15/17 neck US : negative   9/15/17 chest CT - stable up to 4 mm lung nodules. - thought to be prior granulomatous infection.   1/13/2021 neck US: again reviewed today  Right level 5 # 1 low  0.4 x 0.4 x 0.4 cm   Right level 5 # 2 low 0.6 x 0.3 x 0.7 cm   Right level 7 # 1 0.4 x 0.3 x   Left level 6# 1 0.2 x 0.2 x 0.2 cm     ROS  Feeling pretty well - currently about to clsoe on new house. , packing current house  Sleep at night is not good at all  Voice gets raspy sometimes; lost voice 3 days ago;   Cardiac: ? A little due to anxiety; recalls having tachycardia during pregnancy;   Respiratory: negative  GI: diarrhea /cramping attributed to lack of gallbladder; takes pantoprazole intermittently   Knee is now 80% - really good  She has not had known covid - she wonders about symptoms from early pandemic, including loss of taste/smell, couldn't get a test  She has had vaccine  For covid  Stress, a lot of my mind-- using CALM ced  Menses irregular with Mirena  IUD in place -       Past Medical History:   Diagnosis Date     Contact dermatitis and other eczema, due to unspecified cause      Ganglion, unspecified      Lung nodules 2017    up to 4 mm; ? prior granulomatous     Migraine, unspecified, without mention of intractable migraine without mention of status migrainosus      Nausea alone      Obesity 2011    BMI 38     Other specified disorders of biliary tract      Pain in joint, site unspecified      Papillary thyroid carcinoma (H) 05/2015     PONV (postoperative nausea and vomiting)     slow to awaken from anesthesia     Post-surgical hypothyroidism 05/2015     Streptococcal sore throat      Unspecified gastritis and gastroduodenitis without mention of hemorrhage      Unspecified vitamin D deficiency      Past Surgical History:   Procedure Laterality Date     APPENDECTOMY       ARTHROSCOPY KNEE Left 10/9/2019     Procedure: Left Knee Arthroscopy;  Surgeon: Chaparro Brooke MD;  Location: [facility] OR     ARTHROTOMY WITH FRESH OSTEOCHONDRAL ALLOGRAFT KNEE Left 10/9/2019    Procedure: Medial Femoral Condyle, Osteochondral With Allograft;  Surgeon: Chaparro Brooke MD;  Location: [facility] OR     HYSTERECTOMY       LAPAROSCOPIC CHOLECYSTECTOMY  9/6/2013    Procedure: LAPAROSCOPIC CHOLECYSTECTOMY;  LAPAROSCOPIC CHOLECYSTECTOMY;  Surgeon: Chaparro Benton MD;  Location: [facility]     ORTHOPEDIC SURGERY      knee arthroscopy, arthrocentesis aspiration of ganglion cyst of left wrist     OSTEOTOMY TIBIA Left 10/9/2019    Procedure: Left Knee Proximal Tibial Osteotomy;  Surgeon: Chaparro Brooke MD;  Location: [facility]     THYROIDECTOMY N/A 5/5/2015    Procedure: THYROIDECTOMY;  Surgeon: Rosenda Coughlin MD;  Location: [facility] OR     Current Outpatient Medications   Medication Sig Dispense Refill     levothyroxine (SYNTHROID/LEVOTHROID) 150 MCG tablet Take 1 tablet (150 mcg) by mouth daily 90 tablet 4     lisdexamfetamine (VYVANSE) 50 MG capsule        Multiple Vitamin (MULTI-VITAMINS) TABS Take 1 tablet by mouth       pantoprazole (PROTONIX) 40 MG EC tablet Take 40 mg by mouth daily       spironolactone (ALDACTONE) 25 MG tablet Take 125 mg by mouth daily as needed (takes for acne flare ups)       amphetamine-dextroamphetamine (ADDERALL) 10 MG per tablet  (Patient not taking: Reported on 7/16/2021)       Melatonin gummies prn      Family History   Problem Relation Age of Onset     Unknown/Adopted No family hx of      Social History     Tobacco Use     Smoking status: Never Smoker     Smokeless tobacco: Never Used   Substance Use Topics     Alcohol use: No     Drug use: No     Social: ; about to move to a new house; sold old house;     GENERAL: no distress.  I can see head only . Talking. Slightly hoarse  BP Readings from Last 1 Encounters:   10/21/19 118/79      Pulse Readings from Last 1 Encounters:   10/21/19 90     "  Resp Readings from Last 1 Encounters:   10/11/19 18      Temp Readings from Last 1 Encounters:   10/11/19 96.4  F (35.8  C) (Axillary)      SpO2 Readings from Last 1 Encounters:   10/11/19 95%      Wt Readings from Last 1 Encounters:   10/21/19 93.9 kg (207 lb)      Ht Readings from Last 1 Encounters:   10/21/19 1.651 m (5' 5\")     SKIN: Visible skin clear.   EYES: Eyes grossly normal to inspection.  No discharge or erythema, or obvious scleral/conjunctival abnormalities.  RESP: No audible wheeze, cough, or visible cyanosis.  No visible retractions or increased work of breathing.    NEURO: Awake, alert, responds appropriately to questions.  Mentation and speech fluent.  PSYCH:affect normal and appearance well-groomed.    Results for BLAISE BELTRAN (MRN [id number]) as of 7/19/2021 13:23   Ref. Range 1/13/2021 12:20   T4 Free Latest Ref Range: 0.76 - 1.46 ng/dL 1.35   TSH Latest Ref Range: 0.40 - 4.00 mU/L 0.20 (L)   Lab Scanned Result Unknown THYROG-Scanned     EXAMINATION: US HEAD NECK SOFT TISSUE, 1/13/2021 12:48 PM      COMPARISON: Soft tissue neck ultrasound 9/15/2017     HISTORY: Postsurgical hypothyroidism; thyroid cancer     TECHNIQUE: Sonographic imaging performed of the neck to evaluate for  lymph nodes using grey scale and limited Doppler technique.     FINDINGS:     Lymph nodes are measured bilaterally with measurements given in  transverse, AP, and craniocaudal dimensions as follows:     Right:  Level 1: None  Level 2: Hypoechoic, ovoid lymph node with suggestion of a fatty hilum  measuring 1.4 x 0.5 x 1.5 cm.  Level 3: None  Level 4: None  Level 5: Two ovoid, hypoechoic lymph nodes, with suggestion of a fatty  hilum, measuring 0.4 x 0.4 x 0.4 cm and 0.6 x 0.3 x 0.7 cm,  respectively.  Level 6: None  Level 7: Focal, ovoid lymph node, with suggestion of a fatty hilum,  measuring 0.4 x 0.3 x 0.5 cm.     Left:  Level 1: None  Level 2: Two hypoechoic, oval lymph nodes, with suggestion of a " fatty  hilum, measuring 1.2 x 0.6 x 1.1 cm and 0.8 x 0.8 x 1.5 cm,  respectively.  Level 3: None  Level 4: None  Level 5: None  Level 6: Hypoechoic, ovoid lymph node with suggestion of a fatty  hilum, measuring 0.2 x 0.2 x 0.2 cm.  Level 7: Hypoechoic, ovoid lymph node with suggestion of a fatty hilum  measuring 0.3 x 0.2 x 0.3 cm.                                                                      IMPRESSION:  Soft tissue neck ultrasound with lymph node measurements as described  above. The lymph nodes demonstrate normal sonographic morphology.     I have personally reviewed the examination and initial interpretation  and I agree with the findings.     ALMA ASCENCIO MD

## 2021-07-19 NOTE — LETTER
7/19/2021       RE: Sinai Shelley  [address]  [facility] [address]     Dear Colleague,    Thank you for referring your patient, Sinai Shelley, to the [facility] ENDOCRINOLOGY CLINIC [place] at [facility]. Please see a copy of my visit note below.    Sinai Shelley  is being evaluated via a billable video visit.      How would you like to obtain your AVS? [email or web address]     For the video visit, send the invitation by: Text to cell phone: [phone]   Will anyone else be joining your video visit? No     Shanice MUELLER MA        Endocrinology video visit Progress note    1.  Papillary thyroid carcinoma, 1.8 cm, + ETE, LN negative, pT3, pN0, pMx; She has been treated with total Tx. TNM stage I or II; MACIS < 6 assuming no distant mets.  She has not had RRA.   In the past I had Suggested 123I TBS as next step but she hasn't had it  Next labs and US in a year, just before next appt in a year.     2.  Post surgical hypothyroidism. Treat to target TSH < 0.4. On LT4 150 mcg/day.    3.  Lung nodules up to 4 mm  thought to be granulomatous disease by CXR and CT.     Due to the COVID 19 pandemic this visit was a telephone/video visit. The patient gave verbal consent for the visit today.    I have independently reviewed and interpreted labs, imaging as indicated.     Chart review/prep time 1  [id number]  Visit Start time 1320  Visit Stop time 1331  _22_ minutes spent on the date of the encounter doing chart review, history and exam, documentation and further activities as noted above.    Karime Horn Md    Chief complaint/ HPI:  Sinai presents for follow up of  papillary thyroid cancer and post surgical hypothyoridism. She was last seen by me 10/2019.  At that time she was on  lT4   150 mcg/day.       The thyroid cancer treatment course has been as follows:   5/5/15  total Tx removing  papillary thyroid carcinoma 1.8 cm with + ETE; LN negative  0/2  pT3pN0,pMx  MACIS 4.64 assuming complete and no distant  She has not had 131I RRA.     We have the following tumor marker data:  6/16/14: Tg < 0.1, JOE < 0.4, TSH 0.03  surgery  8/18/15: Tg < 0.1, JOE < 0.4, TSH 0.47  8/16/16: Tg < 0.1, JOE < 0.4, TSH 0.01  3/6/17: Tg 0.14, JOE < 0.4, TSH 0.76, free T4 1.33 on LT4 150 mcg/day -   9/15/17: Tg < 0.1, JOE < 0.4, TSH < 0.01, free T4 1.15  3/5/18: Tg < 0.1, JOE < 0.4, TSH < 0.01, free T4 1.41 -   10/21/19: Tg < 0.1, JOE < 0.4, TSH 0.61, free T4 1.42 -  1/31/2021 Tg < 0.1, JOE < 0.4, TSH 0.2, free T4 1.35    images on PACS  9/9/13 chest CT: thyroid asymmetrical, right>  L; dense calcs isthmus region  8/18/16 neck US: no abnormal lymph nodes .   9/15/17 neck US : negative   9/15/17 chest CT - stable up to 4 mm lung nodules. - thought to be prior granulomatous infection.   1/13/2021 neck US: again reviewed today  Right level 5 # 1 low  0.4 x 0.4 x 0.4 cm   Right level 5 # 2 low 0.6 x 0.3 x 0.7 cm   Right level 7 # 1 0.4 x 0.3 x   Left level 6# 1 0.2 x 0.2 x 0.2 cm     ROS  Feeling pretty well - currently about to clsoe on new house. , packing current house  Sleep at night is not good at all  Voice gets raspy sometimes; lost voice 3 days ago;   Cardiac: ? A little due to anxiety; recalls having tachycardia during pregnancy;   Respiratory: negative  GI: diarrhea /cramping attributed to lack of gallbladder; takes pantoprazole intermittently   Knee is now 80% - really good  She has not had known covid - she wonders about symptoms from early pandemic, including loss of taste/smell, couldn't get a test  She has had vaccine  For covid  Stress, a lot of my mind-- using CALM ced  Menses irregular with Mirena  IUD in place -       Past Medical History:   Diagnosis Date     Contact dermatitis and other eczema, due to unspecified cause      Ganglion, unspecified      Lung nodules 2017    up to 4 mm; ? prior granulomatous     Migraine, unspecified, without mention of intractable  migraine without mention of status migrainosus      Nausea alone      Obesity 2011    BMI 38     Other specified disorders of biliary tract      Pain in joint, site unspecified      Papillary thyroid carcinoma (H) 05/2015     PONV (postoperative nausea and vomiting)     slow to awaken from anesthesia     Post-surgical hypothyroidism 05/2015     Streptococcal sore throat      Unspecified gastritis and gastroduodenitis without mention of hemorrhage      Unspecified vitamin D deficiency      Past Surgical History:   Procedure Laterality Date     APPENDECTOMY       ARTHROSCOPY KNEE Left 10/9/2019    Procedure: Left Knee Arthroscopy;  Surgeon: Chaparro Brooke MD;  [facility]     ARTHROTOMY WITH FRESH OSTEOCHONDRAL ALLOGRAFT KNEE Left 10/9/2019    Procedure: Medial Femoral Condyle, Osteochondral With Allograft;  Surgeon: Chaparro Brooke MD;  [facility]     HYSTERECTOMY       LAPAROSCOPIC CHOLECYSTECTOMY  9/6/2013    Procedure: LAPAROSCOPIC CHOLECYSTECTOMY;  LAPAROSCOPIC CHOLECYSTECTOMY;  Surgeon: Chaparro Benton MD;  Location: [facility]     ORTHOPEDIC SURGERY      knee arthroscopy, arthrocentesis aspiration of ganglion cyst of left wrist     OSTEOTOMY TIBIA Left 10/9/2019    Procedure: Left Knee Proximal Tibial Osteotomy;  Surgeon: Chaparro Brooke MD;  [facility]     THYROIDECTOMY N/A 5/5/2015    Procedure: THYROIDECTOMY;  Surgeon: Rosenda Coughlin MD;  [facility]     Current Outpatient Medications   Medication Sig Dispense Refill     levothyroxine (SYNTHROID/LEVOTHROID) 150 MCG tablet Take 1 tablet (150 mcg) by mouth daily 90 tablet 4     lisdexamfetamine (VYVANSE) 50 MG capsule        Multiple Vitamin (MULTI-VITAMINS) TABS Take 1 tablet by mouth       pantoprazole (PROTONIX) 40 MG EC tablet Take 40 mg by mouth daily       spironolactone (ALDACTONE) 25 MG tablet Take 125 mg by mouth daily as needed (takes for acne flare ups)       amphetamine-dextroamphetamine (ADDERALL)  "10 MG per tablet  (Patient not taking: Reported on 7/16/2021)       Melatonin gummies prn      Family History   Problem Relation Age of Onset     Unknown/Adopted No family hx of      Social History     Tobacco Use     Smoking status: Never Smoker     Smokeless tobacco: Never Used   Substance Use Topics     Alcohol use: No     Drug use: No     Social: ; about to move to a new house; sold old house;     GENERAL: no distress.  I can see head only . Talking. Slightly hoarse  BP Readings from Last 1 Encounters:   10/21/19 118/79      Pulse Readings from Last 1 Encounters:   10/21/19 90      Resp Readings from Last 1 Encounters:   10/11/19 18      Temp Readings from Last 1 Encounters:   10/11/19 96.4  F (35.8  C) (Axillary)      SpO2 Readings from Last 1 Encounters:   10/11/19 95%      Wt Readings from Last 1 Encounters:   10/21/19 93.9 kg (207 lb)      Ht Readings from Last 1 Encounters:   10/21/19 1.651 m (5' 5\")     SKIN: Visible skin clear.   EYES: Eyes grossly normal to inspection.  No discharge or erythema, or obvious scleral/conjunctival abnormalities.  RESP: No audible wheeze, cough, or visible cyanosis.  No visible retractions or increased work of breathing.    NEURO: Awake, alert, responds appropriately to questions.  Mentation and speech fluent.  PSYCH:affect normal and appearance well-groomed.    Results for BLAISE BELTRAN (MRN [id number]) as of 7/19/2021 13:23   Ref. Range 1/13/2021 12:20   T4 Free Latest Ref Range: 0.76 - 1.46 ng/dL 1.35   TSH Latest Ref Range: 0.40 - 4.00 mU/L 0.20 (L)   Lab Scanned Result Unknown THYROG-Scanned     EXAMINATION: US HEAD NECK SOFT TISSUE, 1/13/2021 12:48 PM      COMPARISON: Soft tissue neck ultrasound 9/15/2017     HISTORY: Postsurgical hypothyroidism; thyroid cancer     TECHNIQUE: Sonographic imaging performed of the neck to evaluate for  lymph nodes using grey scale and limited Doppler technique.     FINDINGS:     Lymph nodes are measured bilaterally with " measurements given in  transverse, AP, and craniocaudal dimensions as follows:     Right:  Level 1: None  Level 2: Hypoechoic, ovoid lymph node with suggestion of a fatty hilum  measuring 1.4 x 0.5 x 1.5 cm.  Level 3: None  Level 4: None  Level 5: Two ovoid, hypoechoic lymph nodes, with suggestion of a fatty  hilum, measuring 0.4 x 0.4 x 0.4 cm and 0.6 x 0.3 x 0.7 cm,  respectively.  Level 6: None  Level 7: Focal, ovoid lymph node, with suggestion of a fatty hilum,  measuring 0.4 x 0.3 x 0.5 cm.     Left:  Level 1: None  Level 2: Two hypoechoic, oval lymph nodes, with suggestion of a fatty  hilum, measuring 1.2 x 0.6 x 1.1 cm and 0.8 x 0.8 x 1.5 cm,  respectively.  Level 3: None  Level 4: None  Level 5: None  Level 6: Hypoechoic, ovoid lymph node with suggestion of a fatty  hilum, measuring 0.2 x 0.2 x 0.2 cm.  Level 7: Hypoechoic, ovoid lymph node with suggestion of a fatty hilum  measuring 0.3 x 0.2 x 0.3 cm.                                                                      IMPRESSION:  Soft tissue neck ultrasound with lymph node measurements as described  above. The lymph nodes demonstrate normal sonographic morphology.     I have personally reviewed the examination and initial interpretation  and I agree with the findings.     ALMA ASCENCIO MD

## 2021-10-03 ENCOUNTER — HEALTH MAINTENANCE LETTER (OUTPATIENT)
Age: 46
End: 2021-10-03

## 2021-11-19 NOTE — MR AVS SNAPSHOT
After Visit Summary   3/5/2018    Sinai Shelley    MRN: [id number]           Patient Information     Date Of Birth          1975        Visit Information        Provider Department      3/5/2018 1:00 PM Karime Horn MD [facility]        Today's Diagnoses     Papillary thyroid carcinoma    -  1    Postsurgical hypothyroidism           Follow-ups after your visit        Follow-up notes from your care team     Return in about 1 year (around 3/5/2019).      Future tests that were ordered for you today     Open Future Orders        Priority Expected Expires Ordered    TSH Routine  3/5/2019 3/5/2018    T4 free Routine  3/5/2019 3/5/2018    Thyroglobulin (Total, antibody & recovery %) Routine  3/5/2019 3/5/2018            Who to contact     Please call your clinic at [phone] to:    Ask questions about your health    Make or cancel appointments    Discuss your medicines    Learn about your test results    Speak to your doctor            Additional Information About Your Visit        [employer] Information     [employer] gives you secure access to your electronic health record. If you see a primary care provider, you can also send messages to your care team and make appointments. If you have questions, please call your primary care clinic.  If you do not have a primary care provider, please call [phone] and they will assist you.      [employer] is an electronic gateway that provides easy, online access to your medical records. With [employer], you can request a clinic appointment, read your test results, renew a prescription or communicate with your care team.     To access your existing account, please contact your [facility] or call [phone] for assistance.        Care EveryWhere ID     This is your Care EveryWhere ID. This could be used by other organizations to access your [place] medical records  [id number]        Your Vitals Were      "Pulse Height BMI (Body Mass Index)             80 1.634 m (5' 4.33\") 35.17 kg/m2          Blood Pressure from Last 3 Encounters:   03/05/18 130/78   09/12/17 131/83   03/06/17 126/80    Weight from Last 3 Encounters:   03/05/18 93.9 kg (207 lb)   09/12/17 102.1 kg (225 lb)   03/06/17 110.9 kg (244 lb 6.4 oz)                 Today's Medication Changes          These changes are accurate as of 3/5/18  1:34 PM.  If you have any questions, ask your nurse or doctor.               Stop taking these medicines if you haven't already. Please contact your care team if you have questions.     CONCERTA 36 MG CR tablet   Generic drug:  methylphenidate ER   Stopped by:  Karime Horn MD                    Primary Care Provider Office Phone # Fax #    Jim Kaycee [phone]       [facility] [id number] [facility] [id number]        Equal Access to Services     [facility]: Hadii julee donohue hadasho Soomaali, waaxda luqadaha, qaybta kaalmada adeegyada, kiley newman . So [facility] [phone].    ATENCIÓN: Si habla español, tiene a rodriguez disposición servicios gratuitos de asistencia lingüística. [facility] [phone].    We comply with applicable federal civil rights laws and [place] laws. We do not discriminate on the basis of race, color, national origin, age, disability, sex, sexual orientation, or gender identity.            Thank you!     Thank you for choosing [facility] ENDOCRINOLOGY  for your care. Our goal is always to provide you with excellent care. Hearing back from our patients is one way we can continue to improve our services. Please take a few minutes to complete the written survey that you may receive in the mail after your visit with us. Thank you!             Your Updated Medication List - Protect others around you: Learn how to safely use, store and throw away your medicines at www.disposemymeds.org.          This list is accurate as of 3/5/18  1:34 PM.  Always use " your most recent med list.                   Brand Name Dispense Instructions for use Diagnosis    ADDERALL 10 MG per tablet   Generic drug:  amphetamine-dextroamphetamine       Thyroid cancer (H), Postsurgical hypothyroidism       levothyroxine 150 MCG tablet    SYNTHROID/LEVOTHROID    96 tablet    Monday-Saturday: (1 tablet/day);  Sunday: (1.5 tablet)    Postsurgical hypothyroidism, Thyroid cancer (H)       MULTI-VITAMINS Tabs      Take 1 tablet by mouth    Postsurgical hypothyroidism, Thyroid cancer (H)       VYVANSE 50 MG capsule   Generic drug:  lisdexamfetamine       Thyroid cancer (H), Postsurgical hypothyroidism          19-Nov-2021 11:14

## 2022-03-18 DIAGNOSIS — C73 THYROID CANCER (H): ICD-10-CM

## 2022-03-18 DIAGNOSIS — E89.0 POSTSURGICAL HYPOTHYROIDISM: ICD-10-CM

## 2022-03-20 ENCOUNTER — HEALTH MAINTENANCE LETTER (OUTPATIENT)
Age: 47
End: 2022-03-20

## 2022-03-23 RX ORDER — LEVOTHYROXINE SODIUM 150 UG/1
150 TABLET ORAL DAILY
Qty: 90 TABLET | Refills: 4 | Status: SHIPPED | OUTPATIENT
Start: 2022-03-23 | End: 2023-04-26

## 2022-03-23 NOTE — TELEPHONE ENCOUNTER
LEVOTHYROXINE 150 MCG TABLET  Last Written Prescription Date:  1/6/2021  Last Fill Quantity: 90,   # refills: 4  Last Office Visit :  7/19/2021  Future Office visit:   5/1/2023    Routing refill request to provider for review/approval because:  Abnormal TSH  Refer to clinic/provider for review     Recent Labs   Lab Test 01/13/21  1220   TSH 0.20*        Alyssa Jean RN  Central Triage Red Flags/Med Refills

## 2022-05-15 ENCOUNTER — HEALTH MAINTENANCE LETTER (OUTPATIENT)
Age: 47
End: 2022-05-15

## 2022-09-10 ENCOUNTER — HEALTH MAINTENANCE LETTER (OUTPATIENT)
Age: 47
End: 2022-09-10

## 2023-04-24 DIAGNOSIS — E89.0 POSTSURGICAL HYPOTHYROIDISM: ICD-10-CM

## 2023-04-24 DIAGNOSIS — C73 THYROID CANCER (H): ICD-10-CM

## 2023-04-26 RX ORDER — LEVOTHYROXINE SODIUM 150 UG/1
TABLET ORAL
Qty: 90 TABLET | Refills: 4 | Status: SHIPPED | OUTPATIENT
Start: 2023-04-26

## 2023-04-30 ENCOUNTER — HEALTH MAINTENANCE LETTER (OUTPATIENT)
Age: 48
End: 2023-04-30

## 2023-05-01 ENCOUNTER — VIRTUAL VISIT (OUTPATIENT)
Dept: ENDOCRINOLOGY | Facility: CLINIC | Age: 48
End: 2023-05-01
Payer: COMMERCIAL

## 2023-05-01 DIAGNOSIS — C73 THYROID CANCER (H): ICD-10-CM

## 2023-05-01 DIAGNOSIS — Z13.31 POSITIVE SCREENING FOR DEPRESSION ON 9-ITEM PATIENT HEALTH QUESTIONNAIRE (PHQ-9): ICD-10-CM

## 2023-05-01 DIAGNOSIS — E89.0 POSTSURGICAL HYPOTHYROIDISM: Primary | ICD-10-CM

## 2023-05-01 PROCEDURE — 99214 OFFICE O/P EST MOD 30 MIN: CPT | Mod: VID

## 2023-05-01 ASSESSMENT — PATIENT HEALTH QUESTIONNAIRE - PHQ9: SUM OF ALL RESPONSES TO PHQ QUESTIONS 1-9: 16

## 2023-05-01 NOTE — PATIENT INSTRUCTIONS
Yearly labs now and in one year about 2 weeks prior to next appt    Neck US in 2023-- make sure to point out the area you are worried about

## 2023-05-01 NOTE — LETTER
2023       RE: Sinai Shelley  [address] [address] [address]     Dear Colleague,    Thank you for referring your patient, Sinai Shelley, to the [facility] ENDOCRINOLOGY CLINIC [place] at [facility]. Please see a copy of my visit note below.    Endocrinology video visit note    1.  Papillary thyroid carcinoma, 1.8 cm, + ETE, LN negative, s/p total Tx. She has not had RRA.  Labs now and in one year  Neck US due to neck concerns she is citing today.      2.  Post surgical hypothyroidism. Treat to target TSH < 0.4. On LT4 150 mcg/day.  I already refilled the LT4 on 23     3.  Lung nodules up to 4 mm  thought to be granulomatous disease by CXR and CT.     4.  High PHQ9 score - Positive screen for suicidality - I missed this prior to /during our appt - didn't see the alert until after the appt.  I did not discuss this with her.  I was informed she refused to be transferred to RN triage which would have been the next step in response to the PHQ .  I will reach out to her via [employer] following the appt     Due to the COVID 19 pandemic this visit was a video visit. The patient gave verbal consent for the visit today.    I have independently reviewed and interpreted labs, imaging as indicated.     Distant Location (provider location):  Off-site  Mode of Communication:  Video Conference via [employer]  Chart review/prep time 1  [id number]  Visit Start time  1543   Visit Stop time  154   26__ minutes spent on the date of the encounter doing chart review, history and exam, documentation and further activities as noted above.  .    Karime Horn Md    Chief complaint/ HPI:  Sinai presents for follow up of  papillary thyroid cancer and post surgical hypothyoridism. She was last seen by me 2021.  At that time she was on  lT4   150 mcg/day. I had ordered labs and US for prior to the next yearly appt, which have now , never  done .       The thyroid cancer treatment course has been as follows:   5/5/15  total Tx removing  papillary thyroid carcinoma 1.8 cm with + ETE; LN negative 0/2  pT3pN0,pMx  MACIS 4.64 assuming complete and no distant  She has not had 131I RRA.     We have the following tumor marker data:  6/16/14: Tg < 0.1, JOE < 0.4, TSH 0.03  surgery  8/18/15: Tg < 0.1, JOE < 0.4, TSH 0.47  8/16/16: Tg < 0.1, JOE < 0.4, TSH 0.01  3/6/17: Tg 0.14, JOE < 0.4, TSH 0.76, free T4 1.33 on LT4 150 mcg/day -   9/15/17: Tg < 0.1, JOE < 0.4, TSH < 0.01, free T4 1.15  3/5/18: Tg < 0.1, JOE < 0.4, TSH < 0.01, free T4 1.41 -   10/21/19: Tg < 0.1, JOE < 0.4, TSH 0.61, free T4 1.42 -  1/31/2021 Tg < 0.1, JOE < 0.4, TSH 0.2, free T4 1.35    images on PACS  9/9/13 chest CT: thyroid asymmetrical, right>  L; dense calcs isthmus region  8/18/16 neck US: no abnormal lymph nodes .   9/15/17 neck US : negative   9/15/17 chest CT - stable up to 4 mm lung nodules. - thought to be prior granulomatous infection.   1/13/2021 neck US: again reviewed today  Right level 5 # 1 low  0.4 x 0.4 x 0.4 cm   Right level 5 # 2 low 0.6 x 0.3 x 0.7 cm   Right level 7 # 1 0.4 x 0.3 x   Left level 6# 1 0.2 x 0.2 x 0.2 cm     ROS    Wonders if there is something in the throat-- she   Now on HRT for menopause- estrogen patch 0.75 twice/week  and progesterone IUD has been placed  Testosterone cream    Break through from time to time    Answers for HPI/ROS submitted by the patient on 5/1/2023  General Symptoms: No  Skin Symptoms: No  HENT Symptoms: No  EYE SYMPTOMS: No  HEART SYMPTOMS: No  LUNG SYMPTOMS: No  INTESTINAL SYMPTOMS: No  URINARY SYMPTOMS: No  GYNECOLOGIC SYMPTOMS: No  BREAST SYMPTOMS: No  SKELETAL SYMPTOMS: No  BLOOD SYMPTOMS: No  NERVOUS SYSTEM SYMPTOMS: No  MENTAL HEALTH SYMPTOMS: No      Past Medical History:   Diagnosis Date    Contact dermatitis and other eczema, due to unspecified cause     Ganglion, unspecified     Lung nodules 2017    up to 4 mm; ? prior  granulomatous    Migraine, unspecified, without mention of intractable migraine without mention of status migrainosus     Nausea alone     Obesity 2011    BMI 38    Other specified disorders of biliary tract     Pain in joint, site unspecified     Papillary thyroid carcinoma (H) 05/2015    PONV (postoperative nausea and vomiting)     slow to awaken from anesthesia    Post-surgical hypothyroidism 05/2015    Streptococcal sore throat     Unspecified gastritis and gastroduodenitis without mention of hemorrhage     Unspecified vitamin D deficiency      Past Surgical History:   Procedure Laterality Date    APPENDECTOMY      ARTHROSCOPY KNEE Left 10/9/2019    Procedure: Left Knee Arthroscopy;  Surgeon: Chaparro Brooke MD;  [facility]    ARTHROTOMY WITH FRESH OSTEOCHONDRAL ALLOGRAFT KNEE Left 10/9/2019    Procedure: Medial Femoral Condyle, Osteochondral With Allograft;  Surgeon: Chaparro Brooke MD;  [facility]    HYSTERECTOMY      LAPAROSCOPIC CHOLECYSTECTOMY  9/6/2013    Procedure: LAPAROSCOPIC CHOLECYSTECTOMY;  LAPAROSCOPIC CHOLECYSTECTOMY;  Surgeon: Chaparro Benton MD;  Location: [facility]    ORTHOPEDIC SURGERY      knee arthroscopy, arthrocentesis aspiration of ganglion cyst of left wrist    OSTEOTOMY TIBIA Left 10/9/2019    Procedure: Left Knee Proximal Tibial Osteotomy;  Surgeon: Chaparro Brooke MD;  [facility]    THYROIDECTOMY N/A 5/5/2015    Procedure: THYROIDECTOMY;  Surgeon: Rosenda Coguhlin MD;  [facility]     Current Outpatient Medications   Medication Sig Dispense Refill    amphetamine-dextroamphetamine (ADDERALL) 10 MG per tablet  (Patient not taking: Reported on 7/16/2021)      levothyroxine (SYNTHROID/LEVOTHROID) 150 MCG tablet TAKE 1 TABLET BY MOUTH EVERY DAY 90 tablet 4    lisdexamfetamine (VYVANSE) 50 MG capsule       Multiple Vitamin (MULTI-VITAMINS) TABS Take 1 tablet by mouth      pantoprazole (PROTONIX) 40 MG EC tablet Take 40 mg by mouth daily       "spironolactone (ALDACTONE) 25 MG tablet Take 125 mg by mouth daily as needed (takes for acne flare ups)       adderall is prn     Family History   Problem Relation Age of Onset    Unknown/Adopted No family hx of      Social History     Tobacco Use    Smoking status: Never    Smokeless tobacco: Never   Substance Use Topics    Alcohol use: No    Drug use: No     Social:   Has to take kids somewhere now which is why she is driving     GENERAL: no distress.  She is driving .  I can see child in the back seat.  The camera appears to be looking at her from the drivers seat and she has both hadns on the wheel and eyes on the road ; Baseball cap; voice normal   BP Readings from Last 1 Encounters:   10/21/19 118/79      Pulse Readings from Last 1 Encounters:   10/21/19 90      Resp Readings from Last 1 Encounters:   10/11/19 18      Temp Readings from Last 1 Encounters:   10/11/19 96.4  F (35.8  C) (Axillary)      SpO2 Readings from Last 1 Encounters:   10/11/19 95%      Wt Readings from Last 1 Encounters:   10/21/19 93.9 kg (207 lb)      Ht Readings from Last 1 Encounters:   10/21/19 1.651 m (5' 5\")     SKIN: Visible skin clear.   EYES: Eyes grossly normal to inspection.  .  RESP: No audible wheeze, cough, orincreased work of breathing.    NEURO: Awake, alert, responds appropriately to questions.  Mentation and speech fluent.  PSYCH:affect normal and appearance well-groomed.          Depression Response    Patient completed the PHQ-9 assessment for depression and scored >9? Yes  Question 9 on the PHQ-9 was positive for suicidality? Yes  Does patient have current mental health provider? Yes    Is this a virtual visit? Yes   Does patient have suicidal ideation (positive question 9)? Yes, refused transfer, seeing a therapist and under the care of her primary      I personally notified the following: visit provider         Karime Horn MD      "

## 2023-05-01 NOTE — NURSING NOTE
Is the patient currently in the state of [place]? YES    Visit mode:TELEPHONE    If the visit is dropped, the patient can be reconnected by: VIDEO VISIT: Text to cell phone: [phone]    Will anyone else be joining the visit? NO      How would you like to obtain your AVS? MyChart    Are changes needed to the allergy or medication list? YES: On estrigen patch and prozac now    Reason for visit: Follow Up and Video Visit

## 2023-05-01 NOTE — PROGRESS NOTES
Depression Response    Patient completed the PHQ-9 assessment for depression and scored >9? Yes  Question 9 on the PHQ-9 was positive for suicidality? Yes  Does patient have current mental health provider? Yes    Is this a virtual visit? Yes   Does patient have suicidal ideation (positive question 9)? Yes, refused transfer, seeing a therapist and under the care of her primary      I personally notified the following: visit provider

## 2023-05-01 NOTE — PROGRESS NOTES
Endocrinology video visit note    1.  Papillary thyroid carcinoma, 1.8 cm, + ETE, LN negative, s/p total Tx. She has not had RRA.  Labs now and in one year  Neck US due to neck concerns she is citing today.      2.  Post surgical hypothyroidism. Treat to target TSH < 0.4. On LT4 150 mcg/day.  I already refilled the LT4 on 23     3.  Lung nodules up to 4 mm  thought to be granulomatous disease by CXR and CT.     4.  High PHQ9 score - Positive screen for suicidality - I missed this prior to /during our appt - didn't see the alert until after the appt.  I did not discuss this with her.  I was informed she refused to be transferred to RN triage which would have been the next step in response to the PHQ .  I will reach out to her via [employer] following the appt     Due to the COVID 19 pandemic this visit was a video visit. The patient gave verbal consent for the visit today.    I have independently reviewed and interpreted labs, imaging as indicated.     Distant Location (provider location):  Off-site  Mode of Communication:  Video Conference via [employer]  Chart review/prep time 1  [id number]  Visit Start time  1543   Visit Stop time  154   26__ minutes spent on the date of the encounter doing chart review, history and exam, documentation and further activities as noted above.  .    Karime Horn Md    Chief complaint/ HPI:  [facility] presents for follow up of  papillary thyroid cancer and post surgical hypothyoridism. She was last seen by me 2021.  At that time she was on  lT4   150 mcg/day. I had ordered labs and US for prior to the next yearly appt, which have now , never done .       The thyroid cancer treatment course has been as follows:   5/5/15  total Tx removing  papillary thyroid carcinoma 1.8 cm with + ETE; LN negative 0/2  pT3pN0,pMx  MACIS 4.64 assuming complete and no distant  She has not had 131I RRA.     We have the following tumor marker data:  14: Tg < 0.1, JOE < 0.4, TSH  0.03  surgery  8/18/15: Tg < 0.1, JOE < 0.4, TSH 0.47  8/16/16: Tg < 0.1, JOE < 0.4, TSH 0.01  3/6/17: Tg 0.14, JOE < 0.4, TSH 0.76, free T4 1.33 on LT4 150 mcg/day -   9/15/17: Tg < 0.1, JOE < 0.4, TSH < 0.01, free T4 1.15  3/5/18: Tg < 0.1, JOE < 0.4, TSH < 0.01, free T4 1.41 -   10/21/19: Tg < 0.1, JOE < 0.4, TSH 0.61, free T4 1.42 -  1/31/2021 Tg < 0.1, JOE < 0.4, TSH 0.2, free T4 1.35    images on PACS  9/9/13 chest CT: thyroid asymmetrical, right>  L; dense calcs isthmus region  8/18/16 neck US: no abnormal lymph nodes .   9/15/17 neck US : negative   9/15/17 chest CT - stable up to 4 mm lung nodules. - thought to be prior granulomatous infection.   1/13/2021 neck US: again reviewed today  Right level 5 # 1 low  0.4 x 0.4 x 0.4 cm   Right level 5 # 2 low 0.6 x 0.3 x 0.7 cm   Right level 7 # 1 0.4 x 0.3 x   Left level 6# 1 0.2 x 0.2 x 0.2 cm     ROS    Wonders if there is something in the throat-- she   Now on HRT for menopause- estrogen patch 0.75 twice/week  and progesterone IUD has been placed  Testosterone cream    Break through from time to time    Answers for HPI/ROS submitted by the patient on 5/1/2023  General Symptoms: No  Skin Symptoms: No  HENT Symptoms: No  EYE SYMPTOMS: No  HEART SYMPTOMS: No  LUNG SYMPTOMS: No  INTESTINAL SYMPTOMS: No  URINARY SYMPTOMS: No  GYNECOLOGIC SYMPTOMS: No  BREAST SYMPTOMS: No  SKELETAL SYMPTOMS: No  BLOOD SYMPTOMS: No  NERVOUS SYSTEM SYMPTOMS: No  MENTAL HEALTH SYMPTOMS: No      Past Medical History:   Diagnosis Date     Contact dermatitis and other eczema, due to unspecified cause      Ganglion, unspecified      Lung nodules 2017    up to 4 mm; ? prior granulomatous     Migraine, unspecified, without mention of intractable migraine without mention of status migrainosus      Nausea alone      Obesity 2011    BMI 38     Other specified disorders of biliary tract      Pain in joint, site unspecified      Papillary thyroid carcinoma (H) 05/2015     PONV (postoperative  nausea and vomiting)     slow to awaken from anesthesia     Post-surgical hypothyroidism 05/2015     Streptococcal sore throat      Unspecified gastritis and gastroduodenitis without mention of hemorrhage      Unspecified vitamin D deficiency      Past Surgical History:   Procedure Laterality Date     APPENDECTOMY       ARTHROSCOPY KNEE Left 10/9/2019    Procedure: Left Knee Arthroscopy;  Surgeon: Chaparro Brooke MD;  Location: [facility]     ARTHROTOMY WITH FRESH OSTEOCHONDRAL ALLOGRAFT KNEE Left 10/9/2019    Procedure: Medial Femoral Condyle, Osteochondral With Allograft;  Surgeon: Chaparro Brooke MD;  Location: [facility] OR     HYSTERECTOMY       LAPAROSCOPIC CHOLECYSTECTOMY  9/6/2013    Procedure: LAPAROSCOPIC CHOLECYSTECTOMY;  LAPAROSCOPIC CHOLECYSTECTOMY;  Surgeon: Chaparro Benton MD;  Location: [facility]     ORTHOPEDIC SURGERY      knee arthroscopy, arthrocentesis aspiration of ganglion cyst of left wrist     OSTEOTOMY TIBIA Left 10/9/2019    Procedure: Left Knee Proximal Tibial Osteotomy;  Surgeon: Chaparro Brooke MD;  Location: [facility]     THYROIDECTOMY N/A 5/5/2015    Procedure: THYROIDECTOMY;  Surgeon: Rosenda Coughlin MD;  Location: [facility] OR     Current Outpatient Medications   Medication Sig Dispense Refill     amphetamine-dextroamphetamine (ADDERALL) 10 MG per tablet  (Patient not taking: Reported on 7/16/2021)       levothyroxine (SYNTHROID/LEVOTHROID) 150 MCG tablet TAKE 1 TABLET BY MOUTH EVERY DAY 90 tablet 4     lisdexamfetamine (VYVANSE) 50 MG capsule        Multiple Vitamin (MULTI-VITAMINS) TABS Take 1 tablet by mouth       pantoprazole (PROTONIX) 40 MG EC tablet Take 40 mg by mouth daily       spironolactone (ALDACTONE) 25 MG tablet Take 125 mg by mouth daily as needed (takes for acne flare ups)       adderall is prn     Family History   Problem Relation Age of Onset     Unknown/Adopted No family hx of      Social History     Tobacco Use     Smoking status: Never     Smokeless  "tobacco: Never   Substance Use Topics     Alcohol use: No     Drug use: No     Social:   Has to take kids somewhere now which is why she is driving     GENERAL: no distress.  She is driving .  I can see child in the back seat.  The camera appears to be looking at her from the drivers seat and she has both hadns on the wheel and eyes on the road ; Baseball cap; voice normal   BP Readings from Last 1 Encounters:   10/21/19 118/79      Pulse Readings from Last 1 Encounters:   10/21/19 90      Resp Readings from Last 1 Encounters:   10/11/19 18      Temp Readings from Last 1 Encounters:   10/11/19 96.4  F (35.8  C) (Axillary)      SpO2 Readings from Last 1 Encounters:   10/11/19 95%      Wt Readings from Last 1 Encounters:   10/21/19 93.9 kg (207 lb)      Ht Readings from Last 1 Encounters:   10/21/19 1.651 m (5' 5\")     SKIN: Visible skin clear.   EYES: Eyes grossly normal to inspection.  .  RESP: No audible wheeze, cough, orincreased work of breathing.    NEURO: Awake, alert, responds appropriately to questions.  Mentation and speech fluent.  PSYCH:affect normal and appearance well-groomed.        "

## 2023-05-02 PROBLEM — Z13.31 POSITIVE SCREENING FOR DEPRESSION ON 9-ITEM PATIENT HEALTH QUESTIONNAIRE (PHQ-9): Status: ACTIVE | Noted: 2023-05-02

## 2023-05-03 ENCOUNTER — TELEPHONE (OUTPATIENT)
Dept: ENDOCRINOLOGY | Facility: CLINIC | Age: 48
End: 2023-05-03
Payer: COMMERCIAL

## 2023-05-03 NOTE — TELEPHONE ENCOUNTER
Patient call:     Appointment type: return endocrine   Provider: Just[facility]   Return date: around 5/1/2024 NESSA goel  Speciality phone number: [phone]  Additional appointment(s) needed: labs now  Labs and US about 2 weeks before f/u appt   Additional notes: LVM and sent MyC to schedule labs now,   US and labs 2 weeks before f/u appt in may 2024  NESSA goel to schedule with Justina   NESSA needs to be scheduled manually (do not schedule where another pt is)     Ray Nguyen on 5/3/2023 at 1:02 PM

## 2023-05-05 ENCOUNTER — TELEPHONE (OUTPATIENT)
Dept: ENDOCRINOLOGY | Facility: CLINIC | Age: 48
End: 2023-05-05
Payer: COMMERCIAL

## 2023-05-05 NOTE — TELEPHONE ENCOUNTER
Message from Dr Horn sent via [employer].   Ananya Mcclure RN on 5/5/2023 at 10:27 AM     M that [employer] message waiting from Dr Horn.   Ananya Mcclure RN on 5/5/2023 at 10:29 AM                                                                                                                                                                                                                                                                                                                                                                                                                                                                                                                                                                                                                                                                                                                                                                                                                                                                                                                                                                                                                                                                                                                                                                                                                                                                                                                                                                                                                                                                                                                                                                                                                                                                                                                                                                                                                                                                                                                                                                                                                                                                                                                                                                                                                                                                                                                                                                                                                                                                                                                                                                                                                                                                                                                                                                                                                                                                                                                                                                                                                                                                                                                                                                                                                                                                                                                                                                                                                                                                                   This message is to inform you that the patient has not yet read the following message. (Notification date: May 5, 2023)     depression score    From   Karime Horn MD To   [facility] Sent and Delivered   5/2/2023  7:30 AM       I missed that you had a high depression score on the screen they gave you prior to our appointment yesterday.  I am  writing to follow up on this.  Do you currently have a mental health provider, counselor or other support?   I can write a referral for this if you don't have it.  Would that be OK with you?  Karime Horn      [facility] [place]    MyChart User Last Read On   [facility] Not Read

## 2023-06-03 ENCOUNTER — HEALTH MAINTENANCE LETTER (OUTPATIENT)
Age: 48
End: 2023-06-03

## 2024-06-07 NOTE — TELEPHONE ENCOUNTER
FLUoxetine (PROZAC) 20 MG capsule     Sig: TAKE 1 CAPSULE BY MOUTH EVERY MORNING    Disp: 90 capsule    Refills: Not specified         SSRIs Protocol [id number]/04/2024 03:15 AM   Protocol Details Recent (12 mo) or future (30 days) visit within the authorizing provider's specialty    Medication is active on med list

## 2024-07-07 ENCOUNTER — HEALTH MAINTENANCE LETTER (OUTPATIENT)
Age: 49
End: 2024-07-07

## 2025-01-10 DIAGNOSIS — C73 THYROID CANCER (H): ICD-10-CM

## 2025-01-10 DIAGNOSIS — E89.0 POSTSURGICAL HYPOTHYROIDISM: ICD-10-CM

## 2025-01-16 RX ORDER — LEVOTHYROXINE SODIUM 150 UG/1
TABLET ORAL
Qty: 90 TABLET | Refills: 4 | Status: SHIPPED | OUTPATIENT
Start: 2025-01-16

## 2025-01-16 NOTE — TELEPHONE ENCOUNTER
Medication Requested:  levothyroxine (SYNTHROID/LEVOTHROID) 150 MCG tablet 90 tablet 4 4/26/2023     ----------------------  Last Office Visit : 5/1/23  Future Office visit:  none  ----------------------  Component      Latest Ref Rng 1/13/2021  12:20 PM   Lab Scanned Result THYROG-Scanned    T4 Free      0.76 - 1.46 ng/dL 1.35    TSH      0.40 - 4.00 mU/L 0.20 (L)        Refill decision: Refill pended and routed to the provider for review/determination due to the following criteria not met:     Provider preference to refill    Linn Bledsoe RN  [facility]/Red Flag Triage & Med Refill Team

## 2025-05-11 ENCOUNTER — HEALTH MAINTENANCE LETTER (OUTPATIENT)
Age: 50
End: 2025-05-11

## 2025-07-13 ENCOUNTER — HEALTH MAINTENANCE LETTER (OUTPATIENT)
Age: 50
End: 2025-07-13

## (undated) DEVICE — LINEN ORTHO PACK 5446

## (undated) DEVICE — BLADE CLIPPER SGL USE 9680

## (undated) DEVICE — DRSG ABDOMINAL 07 1/2X8" 7197D

## (undated) DEVICE — GOWN IMPERVIOUS SPECIALTY XLG/XLONG 32474

## (undated) DEVICE — TUBING SUCTION MEDI-VAC 1/4"X20' N620A

## (undated) DEVICE — DRILL ANCHOR AO CONNECTION ARTHREX HTO IBALANCE AR-13434-02

## (undated) DEVICE — LINEN TOWEL PACK X5 5464

## (undated) DEVICE — GLOVE PROTEXIS W/NEU-THERA 7.5  2D73TE75

## (undated) DEVICE — DRAPE C-ARMOR 5 SIDED 5523

## (undated) DEVICE — BLADE SAW SAGITTAL STRK 19.5X90X1.27MM 2108-109-000S11

## (undated) DEVICE — SU VICRYL 2-0 CT-2 27" UND J269H

## (undated) DEVICE — DRSG STERI STRIP 1/2X4" R1547

## (undated) DEVICE — GOWN XLG DISP 9545

## (undated) DEVICE — COVER CAMERA IN-LIGHT DISP LT-C02

## (undated) DEVICE — TUBING ARTHRO CONMED/LINVATEC PUMP BLUE INFLOW 10K100

## (undated) DEVICE — PIN FIXATION ARTHREX HTO IBALANCE AR-13422

## (undated) DEVICE — ESU PENCIL W/SMOKE EVAC NEPTUNE STRYKER 0703-046-000

## (undated) DEVICE — TOURNIQUET CUFF 34" REPRO BROWN 60-7070-106

## (undated) DEVICE — BONE CLEANING TIP INTERPULSE  0210-010-000

## (undated) DEVICE — NDL 22GA 1.5"

## (undated) DEVICE — KEYHOLE REAMER ARTHREX HTO IBALANCE AR-13425

## (undated) DEVICE — SU ETHIBOND 1 CT-1 30" X425H

## (undated) DEVICE — CAST PADDING 6" STERILE 9046S

## (undated) DEVICE — SPONGE LAP 18X18" X8435

## (undated) DEVICE — SUCTION MANIFOLD DORNOCH ULTRA CART UL-CL500

## (undated) DEVICE — Device

## (undated) DEVICE — NDL 18GA 1.5" 305196

## (undated) DEVICE — SUCTION IRR SYSTEM W/O TIP INTERPULSE HANDPIECE 0210-100-000

## (undated) DEVICE — SU VICRYL 2-0 CT-1 27" UND J259H

## (undated) DEVICE — ESU GROUND PAD ADULT W/CORD E7507

## (undated) DEVICE — BLADE KNIFE SURG 10 371110

## (undated) DEVICE — PREP CHLORAPREP 26ML TINTED ORANGE  260815

## (undated) DEVICE — SOL NACL 0.9% IRRIG 3000ML BAG 2B7477

## (undated) DEVICE — BLADE KNIFE SURG 15 371115

## (undated) DEVICE — SOL NACL 0.9% IRRIG 1000ML BOTTLE 2F7124

## (undated) DEVICE — DRSG GAUZE 4X8" NON21842

## (undated) DEVICE — GLOVE PROTEXIS W/NEU-THERA 7.0  2D73TE70

## (undated) DEVICE — PEN MARKING SKIN W/PAPER RULER 31145785

## (undated) DEVICE — PIN GUIDE 2.4MM ARTHREX AR-13303-2.4

## (undated) DEVICE — GLOVE PROTEXIS POWDER FREE 7.0 ORTHOPEDIC 2D73ET70

## (undated) DEVICE — BLADE SAW SAGITTAL STRK 20.7X85X0.89MM 2108-109-000S13

## (undated) DEVICE — SOL WATER IRRIG 1000ML BOTTLE 2F7114

## (undated) DEVICE — SU ETHILON 3-0 PS-1 18" 1663H

## (undated) DEVICE — DRAPE C-ARM W/STRAPS 42X72" 07-CA104

## (undated) DEVICE — ESU HOLDER LAP INST DISP YELLOW SHORT 250MM H-PRO-250

## (undated) DEVICE — SYR 10ML FINGER CONTROL W/O NDL 309695

## (undated) DEVICE — PACK ACL SUPPLEMENT STD

## (undated) DEVICE — DRAPE U-DRAPE 1015NSD NON-STERILE

## (undated) DEVICE — DRAPE TIBURON TOP SHEET 100X60" 29352

## (undated) DEVICE — STRAP KNEE/BODY 31143004

## (undated) RX ORDER — CEFAZOLIN SODIUM 2 G/100ML
INJECTION, SOLUTION INTRAVENOUS
Status: DISPENSED
Start: 2019-10-09

## (undated) RX ORDER — CEFAZOLIN SODIUM 1 G/3ML
INJECTION, POWDER, FOR SOLUTION INTRAMUSCULAR; INTRAVENOUS
Status: DISPENSED
Start: 2019-10-09

## (undated) RX ORDER — BUPIVACAINE HYDROCHLORIDE AND EPINEPHRINE 2.5; 5 MG/ML; UG/ML
INJECTION, SOLUTION EPIDURAL; INFILTRATION; INTRACAUDAL; PERINEURAL
Status: DISPENSED
Start: 2019-10-09

## (undated) RX ORDER — PROPOFOL 10 MG/ML
INJECTION, EMULSION INTRAVENOUS
Status: DISPENSED
Start: 2019-10-09